# Patient Record
Sex: FEMALE | Race: BLACK OR AFRICAN AMERICAN | Employment: FULL TIME | URBAN - METROPOLITAN AREA
[De-identification: names, ages, dates, MRNs, and addresses within clinical notes are randomized per-mention and may not be internally consistent; named-entity substitution may affect disease eponyms.]

---

## 2019-01-25 ENCOUNTER — HOSPITAL ENCOUNTER (EMERGENCY)
Age: 29
Discharge: HOME | End: 2019-01-25
Attending: EMERGENCY MEDICINE
Payer: MEDICAID

## 2019-01-25 ENCOUNTER — APPOINTMENT (OUTPATIENT)
Dept: ULTRASOUND IMAGING | Age: 29
End: 2019-01-25
Payer: MEDICAID

## 2019-01-25 VITALS
BODY MASS INDEX: 28.7 KG/M2 | OXYGEN SATURATION: 100 % | SYSTOLIC BLOOD PRESSURE: 168 MMHG | HEART RATE: 79 BPM | HEIGHT: 63 IN | DIASTOLIC BLOOD PRESSURE: 117 MMHG | WEIGHT: 162 LBS | RESPIRATION RATE: 14 BRPM

## 2019-01-25 DIAGNOSIS — Z76.0 MEDICATION REFILL: ICD-10-CM

## 2019-01-25 DIAGNOSIS — R10.2 PELVIC PAIN: Primary | ICD-10-CM

## 2019-01-25 DIAGNOSIS — R03.0 ELEVATED BLOOD PRESSURE READING: ICD-10-CM

## 2019-01-25 LAB
AMORPH CRY URNS QL MICRO: ABNORMAL
APPEARANCE UR: ABNORMAL
BACTERIA URNS QL MICRO: NEGATIVE /HPF
BILIRUB UR QL: NEGATIVE
COLOR UR: ABNORMAL
EPITH CASTS URNS QL MICRO: ABNORMAL /LPF
GLUCOSE UR STRIP.AUTO-MCNC: NEGATIVE MG/DL
HCG UR QL: NEGATIVE
HGB UR QL STRIP: NEGATIVE
KETONES UR QL STRIP.AUTO: NEGATIVE MG/DL
LEUKOCYTE ESTERASE UR QL STRIP.AUTO: ABNORMAL
NITRITE UR QL STRIP.AUTO: NEGATIVE
PH UR STRIP: 7.5 [PH] (ref 5–8)
PROT UR STRIP-MCNC: NEGATIVE MG/DL
RBC #/AREA URNS HPF: ABNORMAL /HPF (ref 0–5)
SP GR UR REFRACTOMETRY: 1.02 (ref 1–1.03)
UA: UC IF INDICATED,UAUC: ABNORMAL
UROBILINOGEN UR QL STRIP.AUTO: 1 EU/DL (ref 0.2–1)
WBC URNS QL MICRO: ABNORMAL /HPF (ref 0–4)

## 2019-01-25 PROCEDURE — 76830 TRANSVAGINAL US NON-OB: CPT

## 2019-01-25 PROCEDURE — 74011250637 HC RX REV CODE- 250/637: Performed by: PHYSICIAN ASSISTANT

## 2019-01-25 PROCEDURE — 99282 EMERGENCY DEPT VISIT SF MDM: CPT

## 2019-01-25 PROCEDURE — 81025 URINE PREGNANCY TEST: CPT

## 2019-01-25 PROCEDURE — 81001 URINALYSIS AUTO W/SCOPE: CPT

## 2019-01-25 RX ORDER — LISINOPRIL AND HYDROCHLOROTHIAZIDE 20; 25 MG/1; MG/1
1 TABLET ORAL DAILY
Qty: 30 TAB | Refills: 0 | Status: SHIPPED | OUTPATIENT
Start: 2019-01-25 | End: 2019-02-24

## 2019-01-25 RX ORDER — HYDROCHLOROTHIAZIDE 25 MG/1
25 TABLET ORAL
Status: COMPLETED | OUTPATIENT
Start: 2019-01-25 | End: 2019-01-25

## 2019-01-25 RX ORDER — LIDOCAINE 50 MG/G
PATCH TOPICAL
Qty: 5 EACH | Refills: 0 | Status: SHIPPED | OUTPATIENT
Start: 2019-01-25 | End: 2020-11-30

## 2019-01-25 RX ORDER — NAPROXEN 500 MG/1
500 TABLET ORAL
Qty: 20 TAB | Refills: 0 | Status: SHIPPED | OUTPATIENT
Start: 2019-01-25 | End: 2019-04-15

## 2019-01-25 RX ORDER — LISINOPRIL 20 MG/1
20 TABLET ORAL
Status: COMPLETED | OUTPATIENT
Start: 2019-01-25 | End: 2019-01-25

## 2019-01-25 RX ADMIN — LISINOPRIL 20 MG: 20 TABLET ORAL at 12:29

## 2019-01-25 RX ADMIN — HYDROCHLOROTHIAZIDE 25 MG: 25 TABLET ORAL at 12:29

## 2019-01-25 NOTE — DISCHARGE INSTRUCTIONS
Patient Education        Follow up with Ob/Gyn for recheck. Follow up with primary care for recheck of blood pressure. Return to the Emergency Dept for any worsening pain, nausea/vomiting, fever, decreased oral intake/urine output. Pelvic Pain: Care Instructions  Your Care Instructions    Pelvic pain, or pain in the lower belly, can have many causes. Often pelvic pain is not serious and gets better in a few days. If your pain continues or gets worse, you may need tests and treatment. Tell your doctor about any new symptoms. These may be signs of a serious problem. Follow-up care is a key part of your treatment and safety. Be sure to make and go to all appointments, and call your doctor if you are having problems. It's also a good idea to know your test results and keep a list of the medicines you take. How can you care for yourself at home? · Rest until you feel better. Lie down, and raise your legs by placing a pillow under your knees. · Drink plenty of fluids. You may find that small, frequent sips are easier on your stomach than if you drink a lot at once. Avoid drinks with carbonation or caffeine, such as soda pop, tea, or coffee. · Try eating several small meals instead of 2 or 3 large ones. Eat mild foods, such as rice, dry toast or crackers, bananas, and applesauce. Avoid fatty and spicy foods, other fruits, and alcohol until 48 hours after your symptoms have gone away. · Take an over-the-counter pain medicine, such as acetaminophen (Tylenol), ibuprofen (Advil, Motrin), or naproxen (Aleve). Read and follow all instructions on the label. · Do not take two or more pain medicines at the same time unless the doctor told you to. Many pain medicines have acetaminophen, which is Tylenol. Too much acetaminophen (Tylenol) can be harmful. · You can put a heating pad, a warm cloth, or moist heat on your belly to relieve pain. When should you call for help?   Call your doctor now or seek immediate medical care if:    · You have a new or higher fever.     · You have unusual vaginal bleeding.     · You have new or worse belly or pelvic pain.     · You have vaginal discharge that has increased in amount or smells bad.    Watch closely for changes in your health, and be sure to contact your doctor if:    · You do not get better as expected. Where can you learn more? Go to http://parish-wilman.info/. Enter [phone] in the search box to learn more about \"Pelvic Pain: Care Instructions. \"  Current as of: May 14, 2018  Content Version: 11.9  © 4513-3684 [employer]. Care instructions adapted under license by [employer] (which disclaims liability or warranty for this information). If you have questions about a medical condition or this instruction, always ask your healthcare professional. [address] any warranty or liability for your use of this information. Patient Education        Elevated Blood Pressure: Care Instructions  Your Care Instructions    Blood pressure is a measure of how hard the blood pushes against the walls of your arteries. It's normal for blood pressure to go up and down throughout the day. But if it stays up over time, you have high blood pressure. Two numbers tell you your blood pressure. The first number is the systolic pressure. It shows how hard the blood pushes when your heart is pumping. The second number is the diastolic pressure. It shows how hard the blood pushes between heartbeats, when your heart is relaxed and filling with blood. An ideal blood pressure in adults is less than 120/80 (say \"120 over 80\"). High blood pressure is 140/90 or higher. You have high blood pressure if your top number is 140 or higher or your bottom number is 90 or higher, or both. The main test for high blood pressure is simple, fast, and painless.  To diagnose high blood pressure, your doctor will test your blood pressure at different times. After testing your blood pressure, your doctor may ask you to test it again when you are home. If you are diagnosed with high blood pressure, you can work with your doctor to make a long-term plan to manage it. Follow-up care is a key part of your treatment and safety. Be sure to make and go to all appointments, and call your doctor if you are having problems. It's also a good idea to know your test results and keep a list of the medicines you take. How can you care for yourself at home? · Do not smoke. Smoking increases your risk for heart attack and stroke. If you need help quitting, talk to your doctor about stop-smoking programs and medicines. These can increase your chances of quitting for good. · Stay at a healthy weight. · Try to limit how much sodium you eat to less than 2,300 milligrams (mg) a day. Your doctor may ask you to try to eat less than 1,500 mg a day. · Be physically active. Get at least 30 minutes of exercise on most days of the week. Walking is a good choice. You also may want to do other activities, such as running, swimming, cycling, or playing tennis or team sports. · Avoid or limit alcohol. Talk to your doctor about whether you can drink any alcohol. · Eat plenty of fruits, vegetables, and low-fat dairy products. Eat less saturated and total fats. · Learn how to check your blood pressure at home. When should you call for help? Call your doctor now or seek immediate medical care if:  ? · Your blood pressure is much higher than normal (such as 180/110 or higher). ? · You think high blood pressure is causing symptoms such as:  ¨ Severe headache. ¨ Blurry vision. ? Watch closely for changes in your health, and be sure to contact your doctor if:  ? · You do not get better as expected. Where can you learn more? Go to http://parish-wilman.info/. Enter C698 in the search box to learn more about \"Elevated Blood Pressure: Care Instructions. \"  Current as of: September 21, 2016  Content Version: 11.4  © 9185-8599 [employer]. Care instructions adapted under license by [employer] (which disclaims liability or warranty for this information). If you have questions about a medical condition or this instruction, always ask your healthcare professional. [address] any warranty or liability for your use of this information.

## 2019-01-25 NOTE — ED NOTES
Medicated pt per orders. Pt discharged by PA. Pt provided with discharge instructions Rx and instructions on follow up care. Pt out of ED ambulatory without difficulty accompanied by family.

## 2019-01-25 NOTE — ED PROVIDER NOTES
EMERGENCY DEPARTMENT HISTORY AND PHYSICAL EXAM 
 
 
Date: 1/25/2019 Patient Name: Kun Ruth History of Presenting Illness Chief Complaint Patient presents with  Pelvic Pain For about 2 weeks. Went to her OBGYN today who did a pelvic and saw no abnormalities. Patient reports vaginal spotting. Labs were sent off, sent to ED for ultrasound. History Provided By: Patient HPI: Kun Ruth, 29 y.o. female presents ambulatory to the ED with c/o 2 week h/o R sided pelvic pain. Pt c/o mild vaginal spotting but no discharge. She was evaluated at [facility] with pelvic exam and referred to the ED for ultrasound as they could not fit her in to their schedule. She denied abdominal pain but stated \"but I guess it could be because I am always nauseated\". Pt denied vomiting or diarrhea. No fever/chills. Pt without dysuria/hematuria. Pt is o/w healthy without cough, congestion, ST, shortness of breath, chest pain. Chief Complaint: R sided pelvic pain Duration: 2 Weeks Timing:  Acute Location: R pelvis Quality: Aching Severity: Moderate Modifying Factors: no exacerbating/alleviating features Associated Symptoms: occasional vaginal spotting There are no other complaints, changes, or physical findings at this time. PCP: None Current Outpatient Medications Medication Sig Dispense Refill  ibuprofen (MOTRIN) 600 mg tablet Take 1 Tab by mouth every six (6) hours as needed for Pain. 20 Tab 0  cyclobenzaprine (FLEXERIL) 10 mg tablet Take 1 Tab by mouth three (3) times daily as needed for Muscle Spasm(s). 15 Tab 0  
 lisinopril-hydrochlorothiazide (PRINZIDE, ZESTORETIC) 10-12.5 mg per tablet Take 1 Tab by mouth daily. 20 Tab 0  
 HYDROcodone-acetaminophen (NORCO) 7.5-325 mg per tablet Take 1 Tab by mouth every six (6) hours as needed for Pain. Max Daily Amount: 4 Tabs.  20 Tab 0  
 butalbital-acetaminophen-caffeine (FIORICET) -40 mg per tablet Take 1 Tab by mouth every four (4) hours as needed for Headache. Max Daily Amount: 6 Tabs. Maximum dose 6 capsules daily 15 Tab 0 Past History Past Medical History: 
Past Medical History:  
Diagnosis Date  Abnormal Pap smear HPV  Anemia 9/30/2010  
 no problems in this pregnancy  Anemia 9/30/2010  Delivery normal   
 Elevated BP 9/30/2010  Essential hypertension   
 chronic  Gestational hypertension 9/16/2010  Hypertension Past Surgical History: 
History reviewed. No pertinent surgical history. Family History: 
History reviewed. No pertinent family history. Social History: 
Social History Tobacco Use  Smoking status: Never Smoker Substance Use Topics  Alcohol use: Yes Comment: occasionaly  Drug use: No  
 
 
Allergies: 
No Known Allergies Review of Systems Review of Systems Constitutional: Negative for chills and fever. HENT: Negative for congestion, rhinorrhea and sore throat. Respiratory: Negative for cough and shortness of breath. Cardiovascular: Negative for chest pain and palpitations. Gastrointestinal: Positive for diarrhea. Negative for abdominal pain, nausea and vomiting. Endocrine: Negative for polydipsia, polyphagia and polyuria. Genitourinary: Positive for pelvic pain and vaginal bleeding. Negative for decreased urine volume, dysuria, hematuria, urgency, vaginal discharge and vaginal pain. Musculoskeletal: Negative for back pain, neck pain and neck stiffness. Skin: Negative for rash and wound. Allergic/Immunologic: Negative for food allergies and immunocompromised state. Neurological: Negative for dizziness, weakness and headaches. Psychiatric/Behavioral: Negative for agitation and confusion. Physical Exam  
Physical Exam  
Constitutional: She is oriented to person, place, and time. She appears well-developed and well-nourished. No distress. WDWN AA female, alert, in NAD HENT:  
 Head: Normocephalic and atraumatic. Nose: Nose normal.  
Mouth/Throat: No oropharyngeal exudate. Eyes: Conjunctivae and EOM are normal. Right eye exhibits no discharge. Left eye exhibits no discharge. No scleral icterus. Neck: Normal range of motion. Neck supple. Cardiovascular: Normal rate, regular rhythm, normal heart sounds and intact distal pulses. Pulmonary/Chest: Effort normal and breath sounds normal. No respiratory distress. She has no wheezes. Abdominal: Soft. She exhibits no distension. There is tenderness. There is no rebound and no guarding.  
+ R suprapubic tenderness, no CVAT Musculoskeletal: Normal range of motion. She exhibits no edema or tenderness. Neurological: She is alert and oriented to person, place, and time. She exhibits normal muscle tone. Coordination normal.  
Skin: Skin is warm and dry. She is not diaphoretic. No pallor. Psychiatric: She has a normal mood and affect. Her behavior is normal.  
Nursing note and vitals reviewed. Diagnostic Study Results Labs - No results found for this or any previous visit (from the past 12 hour(s)). Radiologic Studies -  
US TRANSVAGINAL    (Results Pending) US TRANSVAGINAL (Final result) Result time 01/25/19 11:53:34 Final result by Owen Roca MD (01/25/19 11:53:34) Impression:  
 IMPRESSION: No acute process. Ovaries are within normal limits. 
 
   
  
  
  
Narrative: EXAM:  ULTRASOUND FEMALE PELVIS TRANSABDOMINAL AND TRANSVAGINAL 
 
TRANSABDOMINAL  
 
 
 
TRANSVAGINAL 
 
INDICATION: Pelvic pain. COMPARISON: None. TECHNIQUE: Transvaginal sonography was performed with multiple static images of 
the uterus and ovariesobtained. FINDINGS:  
 
UTERUS: 
The uterus is normal. Uterus measures 5.3 x 2.9 x 5 cm. ENDOMETRIUM: 
The endometrial stripe measures 3mm. There is a trace amount of fluid in the 
endometrium RIGHT OVARY: 
 The right ovary is normal. The right ovary measures 3 x 1.5 x 2.7 cm.  There is 
normal color flow to the right ovary. LEFT OVARY: 
The left ovary is normal. The left ovary measures 3.2 x 1.9 x 1.9 cm. There is 
normal color flow to the left ovary. CUL-DE-SAC: 
There is no fluid or mass or other abnormality in the pelvic cul-de-sac.  
  
  
  
   
 
 
 
 
Medical Decision Making I am the first provider for this patient. I reviewed the vital signs, available nursing notes, past medical history, past surgical history, family history and social history. Vital Signs-Reviewed the patient's vital signs. Patient Vitals for the past 12 hrs: 
 Pulse Resp BP SpO2  
01/25/19 1012 79 14 (!) 185/124 100 % Records Reviewed: Nursing Notes, Old Medical Records, Previous Radiology Studies and Previous Laboratory Studies Provider Notes (Medical Decision Making): Ovarian cyst, mass, UTI, pregnancy ED Course:  
Initial assessment performed. The patients presenting problems have been discussed, and they are in agreement with the care plan formulated and outlined with them. I have encouraged them to ask questions as they arise throughout their visit. HTN COUNSELING Reviewed the risks of uncontrolled HTN to include stroke, heart attack, renal failure, aneurysm and death. They will take their medications daily and follow up with their PCP for recheck. DISCHARGE NOTE: 
The care plan has been outline with the patient and/or family, who verbally conveyed understanding and agreement. Available results have been reviewed. Patient and/or family understand the follow up plan as outlined and discharge instructions. Should their condition deterioration at any time after discharge the patient agrees to return, follow up sooner than outlined or seek medical assistance at the closest Emergency Room as soon as possible. Questions have been answered.  Discharge instructions and educational information regarding the patient's diagnosis as well a list of reasons why the patient would want to seek immediate medical attention, should their condition change, were reviewed directly with the patient/family PLAN: 
1. Discharge Medication List as of 1/25/2019 12:43 PM  
  
START taking these medications Details  
naproxen (NAPROSYN) 500 mg tablet Take 1 Tab by mouth every twelve (12) hours as needed for Pain for up to 20 doses. , Print, Disp-20 Tab, R-0  
  
lidocaine (LIDODERM) 5 % Apply patch to the affected area for 12 hours a day and remove for 12 hours a day., Print, Disp-5 Each, R-0  
  
lisinopril-hydroCHLOROthiazide (PRINZIDE, ZESTORETIC) 20-25 mg per tablet Take 1 Tab by mouth daily for 30 days. , Print, Disp-30 Tab, R-0  
  
  
CONTINUE these medications which have NOT CHANGED Details  
lisinopril-hydrochlorothiazide (PRINZIDE, ZESTORETIC) 10-12.5 mg per tablet Take 1 Tab by mouth daily. , Print, Disp-20 Tab, R-0  
  
  
 
2. Follow-up Information Follow up With Specialties Details Why Contact Info [address]    [address]   [phone] [facility] EMERGENCY DEPT Emergency Medicine  If symptoms worsen [address] [facility] 
[phone] Return to ED if worse Diagnosis Clinical Impression: 1. Pelvic pain 2. Elevated blood pressure reading 3. Medication refill

## 2019-01-25 NOTE — LETTER
[address] 
\[facility]\"" EMERGENCY DEPT 
[address] [place] [address] [address] [id number] [phone] Work/School Note Date: 1/25/2019 To Whom It May concern: 
 
Phillip Guadalupe was seen and treated today in the emergency room. She may return to work in 1 to 2 days, as symptoms improve. Sincerely, Sue De Guzman

## 2019-04-11 ENCOUNTER — HOSPITAL ENCOUNTER (EMERGENCY)
Age: 29
Discharge: HOME | End: 2019-04-11
Attending: EMERGENCY MEDICINE
Payer: MEDICAID

## 2019-04-11 VITALS
BODY MASS INDEX: 27.82 KG/M2 | TEMPERATURE: 98.6 F | SYSTOLIC BLOOD PRESSURE: 186 MMHG | WEIGHT: 157 LBS | HEIGHT: 63 IN | RESPIRATION RATE: 16 BRPM | HEART RATE: 67 BPM | OXYGEN SATURATION: 100 % | DIASTOLIC BLOOD PRESSURE: 123 MMHG

## 2019-04-11 DIAGNOSIS — M62.838 MUSCLE SPASMS OF NECK: Primary | ICD-10-CM

## 2019-04-11 DIAGNOSIS — I10 HYPERTENSION, UNSPECIFIED TYPE: ICD-10-CM

## 2019-04-11 PROCEDURE — 74011250636 HC RX REV CODE- 250/636: Performed by: EMERGENCY MEDICINE

## 2019-04-11 PROCEDURE — 96372 THER/PROPH/DIAG INJ SC/IM: CPT

## 2019-04-11 PROCEDURE — 99282 EMERGENCY DEPT VISIT SF MDM: CPT

## 2019-04-11 RX ORDER — KETOROLAC TROMETHAMINE 10 MG/1
10 TABLET, FILM COATED ORAL
Qty: 20 TAB | Refills: 0 | Status: SHIPPED | OUTPATIENT
Start: 2019-04-11 | End: 2019-04-15

## 2019-04-11 RX ORDER — CYCLOBENZAPRINE HCL 10 MG
10 TABLET ORAL
Qty: 12 TAB | Refills: 0 | Status: SHIPPED | OUTPATIENT
Start: 2019-04-11 | End: 2019-04-15

## 2019-04-11 RX ORDER — MEDROXYPROGESTERONE ACETATE 150 MG/ML
150 INJECTION, SUSPENSION INTRAMUSCULAR
COMMUNITY

## 2019-04-11 RX ORDER — OXYCODONE AND ACETAMINOPHEN 5; 325 MG/1; MG/1
1 TABLET ORAL
Qty: 10 TAB | Refills: 0 | Status: SHIPPED | OUTPATIENT
Start: 2019-04-11 | End: 2019-04-14

## 2019-04-11 RX ORDER — KETOROLAC TROMETHAMINE 30 MG/ML
30 INJECTION, SOLUTION INTRAMUSCULAR; INTRAVENOUS
Status: COMPLETED | OUTPATIENT
Start: 2019-04-11 | End: 2019-04-11

## 2019-04-11 RX ADMIN — KETOROLAC TROMETHAMINE 30 MG: 30 INJECTION, SOLUTION INTRAMUSCULAR; INTRAVENOUS at 21:19

## 2019-04-12 NOTE — ED TRIAGE NOTES
Pt comes in with pain at the base of the skull and neck x 1.5 weeks. Pt denies injury. Pt's BP elevated at 204/140 and 189/135 on recheck in triage. Pt reports taking BP medication daily.

## 2019-04-12 NOTE — ED PROVIDER NOTES
31yo female with neck spasm for 1 1/2 weeks with associated headache, who hasn't taken her hctz/lisinopril today. (she does have it at home). She states a week and half ago she awoke with a \"crick\" in her neck, on the right side. Since then she has had pain in her right posterior neck rating up to the right side of her head. She is also had stiffness/decreased movement of her right neck. She rates her pain as 9 out of 10. Neck Pain Associated symptoms include headaches. Pertinent negatives include no chest pain. Hypertension Associated symptoms include headaches and neck pain. Pertinent negatives include no chest pain, no confusion, no dizziness, no shortness of breath and no nausea. Past Medical History:  
Diagnosis Date  Abnormal Pap smear HPV  Anemia 9/30/2010  
 no problems in this pregnancy  Anemia 9/30/2010  Delivery normal   
 Elevated BP 9/30/2010  Essential hypertension   
 chronic  Gestational hypertension 9/16/2010  Hypertension History reviewed. No pertinent surgical history. History reviewed. No pertinent family history. Social History Socioeconomic History  Marital status: SINGLE Spouse name: Not on file  Number of children: Not on file  Years of education: Not on file  Highest education level: Not on file Occupational History  Not on file Social Needs  Financial resource strain: Not on file  Food insecurity:  
  Worry: Not on file Inability: Not on file  Transportation needs:  
  Medical: Not on file Non-medical: Not on file Tobacco Use  Smoking status: Never Smoker  Smokeless tobacco: Never Used Substance and Sexual Activity  Alcohol use: Yes Comment: occasionaly  Drug use: No  
 Sexual activity: Yes  
  Partners: Male Birth control/protection: Implant Lifestyle  Physical activity:  
  Days per week: Not on file Minutes per session: Not on file  Stress: Not on file Relationships  Social connections:  
  Talks on phone: Not on file Gets together: Not on file Attends Syn[employer] service: Not on file Active member of club or organization: Not on file Attends meetings of clubs or organizations: Not on file Relationship status: Not on file  Intimate partner violence:  
  Fear of current or ex partner: Not on file Emotionally abused: Not on file Physically abused: Not on file Forced sexual activity: Not on file Other Topics Concern  Not on file Social History Narrative ** Merged History Encounter ** ALLERGIES: Patient has no known allergies. Review of Systems Constitutional: Negative. Negative for chills, fever and unexpected weight change. HENT: Negative. Negative for congestion and trouble swallowing. Eyes: Negative for discharge. Respiratory: Negative. Negative for cough, chest tightness and shortness of breath. Cardiovascular: Negative. Negative for chest pain. Gastrointestinal: Negative. Negative for abdominal distention, abdominal pain, constipation, diarrhea and nausea. Endocrine: Negative. Genitourinary: Negative. Negative for difficulty urinating, dysuria, frequency and urgency. Musculoskeletal: Positive for neck pain. Negative for arthralgias and myalgias. Skin: Negative. Negative for color change. Allergic/Immunologic: Negative. Neurological: Positive for headaches. Negative for dizziness and speech difficulty. Hematological: Negative. Psychiatric/Behavioral: Negative. Negative for agitation and confusion. All other systems reviewed and are negative. Vitals:  
 04/11/19 2026 04/11/19 2030 BP: (!) 204/140 (!) 189/135 Pulse: 74 Resp: 18 Temp: 98.6 °F (37 °C) SpO2: 100% Weight: 71.2 kg (157 lb) Height: 5' 3\" (1.6 m) Physical Exam  
Constitutional: She is oriented to person, place, and time.  She appears well-developed and well-nourished. HENT:  
Head: Normocephalic and atraumatic. Eyes: Conjunctivae and EOM are normal.  
Neck: Neck supple. Palpable spasm and tenderness Right paraspinal cervical motions. Decreased rightward flexion and rotation of head. Cardiovascular: Normal rate, regular rhythm and intact distal pulses. Pulmonary/Chest: Effort normal. No respiratory distress. Abdominal: Soft. There is no tenderness. Musculoskeletal: Normal range of motion. She exhibits no deformity. Neurological: She is alert and oriented to person, place, and time. Skin: Skin is warm and dry. Psychiatric: She has a normal mood and affect. Her behavior is normal. Thought content normal.  
Vitals reviewed. MDM Procedures LABORATORY TESTS: 
No results found for this or any previous visit (from the past 12 hour(s)). IMAGING RESULTS: 
No orders to display MEDICATIONS GIVEN: 
Medications  
ketorolac (TORADOL) injection 30 mg (30 mg IntraMUSCular Given 4/11/19 2119) IMPRESSION: 
1. Muscle spasms of neck 2. Hypertension, unspecified type PLAN: 
1. Discharge Medication List as of 4/11/2019  9:48 PM  
  
START taking these medications Details  
cyclobenzaprine (FLEXERIL) 10 mg tablet Take 1 Tab by mouth three (3) times daily as needed for Muscle Spasm(s). , Print, Disp-12 Tab, R-0  
  
oxyCODONE-acetaminophen (PERCOCET) 5-325 mg per tablet Take 1 Tab by mouth every eight (8) hours as needed for Pain for up to 3 days. Max Daily Amount: 3 Tabs. Indications: Pain, Print, Disp-10 Tab, R-0  
  
ketorolac (TORADOL) 10 mg tablet Take 1 Tab by mouth every six (6) hours as needed for Pain., Print, Disp-20 Tab, R-0  
  
  
CONTINUE these medications which have NOT CHANGED  Details  
medroxyPROGESTERone (DEPO-PROVERA) 150 mg/mL injection 150 mg by IntraMUSCular route once., Historical Med  
  
naproxen (NAPROSYN) 500 mg tablet Take 1 Tab by mouth every twelve (12) hours as needed for Pain for up to 20 doses. , Print, Disp-20 Tab, R-0  
  
lidocaine (LIDODERM) 5 % Apply patch to the affected area for 12 hours a day and remove for 12 hours a day., Print, Disp-5 Each, R-0  
  
lisinopril-hydrochlorothiazide (PRINZIDE, ZESTORETIC) 10-12.5 mg per tablet Take 1 Tab by mouth daily. , Print, Disp-20 Tab, R-0  
  
  
 
2. Follow-up Information None Return to ED if worse

## 2019-04-12 NOTE — DISCHARGE INSTRUCTIONS
Patient Education        Neck Spasm: Care Instructions  Your Care Instructions  A neck spasm is sudden tightness and pain in your neck muscles. A spasm may be caused by some activities or repeated movements. For example, you may be more likely to have a neck spasm if you slouch, paint a ceiling, work at a computer, or sleep with your neck twisted. But the cause isn't always clear. Home treatment includes using heat or ice, taking over-the-counter (OTC) pain medicines, and avoiding activities that may lead to neck pain. Gentle stretching, or treatments such as massage or manipulation, may also help ease a neck spasm. For a neck spasm that doesn't get better with home care, your doctor may prescribe medicine. He or she may also suggest exercise or physical therapy to help strengthen or relax your neck muscles. Follow-up care is a key part of your treatment and safety. Be sure to make and go to all appointments, and call your doctor if you are having problems. It's also a good idea to know your test results and keep a list of the medicines you take. How can you care for yourself at home? · To relieve pain, use heat or ice (whichever feels better) on the affected area. ? Put a warm water bottle, a heating pad set on low, or a warm cloth on your neck. Put a thin cloth between the heating pad and your skin. Do not go to sleep with a heating pad on your skin. ? Try ice or a cold pack on the area for 10 to 20 minutes at a time. Put a thin cloth between the ice and your skin. · Ask your doctor if you can take acetaminophen (such as Tylenol) or nonsteroidal anti-inflammatory drugs, such as ibuprofen or naproxen. Your doctor can prescribe stronger medicines if needed. Be safe with medicines. Read and follow all instructions on the label. · Stretch your muscles every day, especially before and after exercise and at bedtime. Regular stretching can help relax your muscles.   · Try to find a pillow and a position in bed that help improve your night's rest.  · Try to stay active. It's best to start activity slowly. If an exercise makes your pain worse, stop doing it. When should you call for help? Call 911 anytime you think you may need emergency care. For example, call if:    · You are unable to move an arm or a leg at all.   [facility] your doctor now or seek immediate medical care if:    · You have new or worse symptoms in your arms, legs, belly, or buttocks. Symptoms may include:  ? Numbness or tingling. ? Weakness. ? Pain.     · You lose bladder or bowel control.    Watch closely for changes in your health, and be sure to contact your doctor if:    · You do not get better as expected. Where can you learn more? Go to http://parishSeahorsewilman.info/. Enter E709 in the search box to learn more about \"Neck Spasm: Care Instructions. \"  Current as of: September 20, 2018  Content Version: 11.9  © 6902-5675 [employer]. Care instructions adapted under license by [employer] (which disclaims liability or warranty for this information). If you have questions about a medical condition or this instruction, always ask your healthcare professional. [place] [address] any warranty or liability for your use of this information. Patient Education     Patient Education        Learning About High Blood Pressure  What is high blood pressure? Blood pressure is a measure of how hard the blood pushes against the walls of your arteries. It's normal for blood pressure to go up and down throughout the day, but if it stays up, you have high blood pressure. Another name for high blood pressure is hypertension. Two numbers tell you your blood pressure. The first number is the systolic pressure. It shows how hard the blood pushes when your heart is pumping. The second number is the diastolic pressure.  It shows how hard the blood pushes between heartbeats, when your heart is relaxed and filling with blood. Your doctor will give you a goal for your blood pressure. Your goal will be based on your health and your age. High blood pressure (hypertension) means that the top number stays high, or the bottom number stays high, or both. High blood pressure increases the risk of stroke, heart attack, and other problems. You and your doctor will talk about your risks of these problems based on your blood pressure. What happens when you have high blood pressure? · Blood flows through your arteries with too much force. Over time, this damages the walls of your arteries. But you can't feel it. High blood pressure usually doesn't cause symptoms. · Fat and calcium start to build up in your arteries. This buildup is called plaque. Plaque makes your arteries narrower and stiffer. Blood can't flow through them as easily. · This lack of good blood flow starts to damage some of the organs in your body. This can lead to problems such as coronary artery disease and heart attack, heart failure, stroke, kidney failure, and eye damage. How can you prevent high blood pressure? · Stay at a healthy weight. · Try to limit how much sodium you eat to less than 2,300 milligrams (mg) a day. If you limit your sodium to 1,500 mg a day, you can lower your blood pressure even more. ? Buy foods that are labeled \"unsalted,\" \"sodium-free,\" or \"low-sodium. \" Foods labeled \"reduced-sodium\" and \"light sodium\" may still have too much sodium. ? Flavor your food with garlic, lemon juice, onion, vinegar, herbs, and spices instead of salt. Do not use soy sauce, steak sauce, onion salt, garlic salt, mustard, or ketchup on your food. ? Use less salt (or none) when recipes call for it. You can often use half the salt a recipe calls for without losing flavor. · Be physically active. Get at least 30 minutes of exercise on most days of the week. Walking is a good choice.  You also may want to do other activities, such as running, swimming, cycling, or playing tennis or team sports. · Limit alcohol to 2 drinks a day for men and 1 drink a day for women. · Eat plenty of fruits, vegetables, and low-fat dairy products. Eat less saturated and total fats. How is high blood pressure treated? · Your doctor will suggest making lifestyle changes. For example, your doctor may ask you to eat healthy foods, quit smoking, lose extra weight, and be more active. · If lifestyle changes don't help enough, your doctor may recommend that you take medicine. · When blood pressure is very high, medicines are needed to lower it. Follow-up care is a key part of your treatment and safety. Be sure to make and go to all appointments, and call your doctor if you are having problems. It's also a good idea to know your test results and keep a list of the medicines you take. Where can you learn more? Go to http://parish-wilman.info/. Enter P501 in the search box to learn more about \"Learning About High Blood Pressure. \"  Current as of: July 22, 2018  Content Version: 11.9  © 6661-4754 [employer]. Care instructions adapted under license by [employer] (which disclaims liability or warranty for this information). If you have questions about a medical condition or this instruction, always ask your healthcare professional. [address] any warranty or liability for your use of this information. Torticollis: Care Instructions  Your Care Instructions  Torticollis is a severe tightness of the muscles on one side of the neck. The tight muscles can make the head turn to one side, lean to one side, or be pulled forward or backward. It is also called wryneck. Your doctor asked questions about your health and examined you. You may also have had X-rays or other tests. If your doctor thinks another medical problem is causing your tight neck muscles, you may need more tests. Torticollis usually gets better with home care. Your doctor may have you take medicine to relieve pain or relax your muscles. He or she may suggest exercise and physical therapy to help increase flexibility and relieve stress. Your doctor may also have you wear a special collar, called a cervical collar, for a day or two. The collar may help make your neck more comfortable. Follow-up care is a key part of your treatment and safety. Be sure to make and go to all appointments, and call your doctor if you are having problems. It's also a good idea to know your test results and keep a list of the medicines you take. How can you care for yourself at home? · Be safe with medicines. Read and follow all instructions on the label. ? If the doctor gave you a prescription medicine for pain, take it as prescribed. ? If you are not taking a prescription pain medicine, ask your doctor if you can take an over-the-counter medicine. · Try using a heating pad on a low or medium setting for 15 to 20 minutes every 2 or 3 hours. Try a warm shower in place of one session with the heating pad. · Try using an ice pack for 10 to 15 minutes every 2 to 3 hours. Put a thin cloth between the ice and your skin. · If your doctor recommends a cervical collar, wear it exactly as directed. When should you call for help? Call your doctor now or seek immediate medical care if:    · You have new or worse numbness in your arms, buttocks, or legs.     · You have new or worse weakness in your arms or legs.     · Your neck pain gets worse.     · You lose bladder or bowel control.    Watch closely for changes in your health, and be sure to contact your doctor if:    · You do not get better as expected. Where can you learn more? Go to http://parish-wilman.info/. Enter I706 in the search box to learn more about \"Torticollis: Care Instructions. \"  Current as of: September 20, 2018  Content Version: 11.9  © 8206-7801 [employer].  Care instructions adapted under license by [address] (which disclaims liability or warranty for this information). If you have questions about a medical condition or this instruction, always ask your healthcare professional. [address] any warranty or liability for your use of this information.

## 2019-04-12 NOTE — ED NOTES
Emergency Department Nursing Plan of Care The Nursing Plan of Care is developed from the Nursing assessment and Emergency Department Attending provider initial evaluation. The plan of care may be reviewed in the ED Provider note. The Plan of Care was developed with the following considerations:  
Patient / Family readiness to learn indicated by:verbalized understanding Persons(s) to be included in education: patient Barriers to Learning/Limitations:No 
 
Signed Jamarcus Venegas RN   
4/11/2019   9:35 PM

## 2019-04-12 NOTE — ED NOTES
Patient (s) was given copy of dc instructions and three paper script(s) and no electronic scripts. Patient (s) has verbalized understanding of instructions and script (s). Patient was given a current medication reconciliation form and verbalized understanding of their medications. Patient (s) has verbalized understanding of the importance of discussing medications with  his or her physician or clinic they will be following up with. Patient alert and oriented and in no acute distress. Patient offered wheelchair from treatment area to hospital entrance, patient declined wheelchair. Patient left ED with self.

## 2019-04-15 ENCOUNTER — HOSPITAL ENCOUNTER (EMERGENCY)
Age: 29
Discharge: HOME | End: 2019-04-15
Attending: EMERGENCY MEDICINE
Payer: MEDICAID

## 2019-04-15 ENCOUNTER — APPOINTMENT (OUTPATIENT)
Dept: CT IMAGING | Age: 29
End: 2019-04-15
Attending: PHYSICIAN ASSISTANT
Payer: MEDICAID

## 2019-04-15 ENCOUNTER — APPOINTMENT (OUTPATIENT)
Dept: ULTRASOUND IMAGING | Age: 29
End: 2019-04-15
Attending: PHYSICIAN ASSISTANT
Payer: MEDICAID

## 2019-04-15 VITALS
WEIGHT: 150 LBS | BODY MASS INDEX: 26.58 KG/M2 | TEMPERATURE: 98.9 F | RESPIRATION RATE: 16 BRPM | HEART RATE: 70 BPM | DIASTOLIC BLOOD PRESSURE: 126 MMHG | OXYGEN SATURATION: 97 % | HEIGHT: 63 IN | SYSTOLIC BLOOD PRESSURE: 176 MMHG

## 2019-04-15 DIAGNOSIS — N20.1 RIGHT URETERAL STONE: Primary | ICD-10-CM

## 2019-04-15 LAB
ALBUMIN SERPL-MCNC: 3.7 G/DL (ref 3.5–5)
ALBUMIN/GLOB SERPL: 0.9 {RATIO} (ref 1.1–2.2)
ALP SERPL-CCNC: 167 U/L (ref 45–117)
ALT SERPL-CCNC: 18 U/L (ref 12–78)
ANION GAP SERPL CALC-SCNC: 3 MMOL/L (ref 5–15)
APPEARANCE UR: ABNORMAL
AST SERPL-CCNC: 19 U/L (ref 15–37)
BACTERIA URNS QL MICRO: ABNORMAL /HPF
BASOPHILS # BLD: 0.1 K/UL (ref 0–0.1)
BASOPHILS NFR BLD: 1 % (ref 0–1)
BILIRUB SERPL-MCNC: 0.2 MG/DL (ref 0.2–1)
BILIRUB UR QL: NEGATIVE
BUN SERPL-MCNC: 16 MG/DL (ref 6–20)
BUN/CREAT SERPL: 16 (ref 12–20)
CALCIUM SERPL-MCNC: 9.1 MG/DL (ref 8.5–10.1)
CHLORIDE SERPL-SCNC: 105 MMOL/L (ref 97–108)
CO2 SERPL-SCNC: 28 MMOL/L (ref 21–32)
COLOR UR: ABNORMAL
COMMENT, HOLDF: NORMAL
CREAT SERPL-MCNC: 1.03 MG/DL (ref 0.55–1.02)
DIFFERENTIAL METHOD BLD: ABNORMAL
EOSINOPHIL # BLD: 0.1 K/UL (ref 0–0.4)
EOSINOPHIL NFR BLD: 1 % (ref 0–7)
EPITH CASTS URNS QL MICRO: ABNORMAL /LPF
ERYTHROCYTE [DISTWIDTH] IN BLOOD BY AUTOMATED COUNT: 13.6 % (ref 11.5–14.5)
GLOBULIN SER CALC-MCNC: 4 G/DL (ref 2–4)
GLUCOSE SERPL-MCNC: 79 MG/DL (ref 65–100)
GLUCOSE UR STRIP.AUTO-MCNC: NEGATIVE MG/DL
HCG UR QL: NEGATIVE
HCT VFR BLD AUTO: 44.6 % (ref 35–47)
HGB BLD-MCNC: 14.8 G/DL (ref 11.5–16)
HGB UR QL STRIP: ABNORMAL
HYALINE CASTS URNS QL MICRO: ABNORMAL /LPF (ref 0–5)
IMM GRANULOCYTES # BLD AUTO: 0 K/UL (ref 0–0.04)
IMM GRANULOCYTES NFR BLD AUTO: 0 % (ref 0–0.5)
KETONES UR QL STRIP.AUTO: NEGATIVE MG/DL
LEUKOCYTE ESTERASE UR QL STRIP.AUTO: NEGATIVE
LYMPHOCYTES # BLD: 0.8 K/UL (ref 0.8–3.5)
LYMPHOCYTES NFR BLD: 14 % (ref 12–49)
MCH RBC QN AUTO: 30.9 PG (ref 26–34)
MCHC RBC AUTO-ENTMCNC: 33.2 G/DL (ref 30–36.5)
MCV RBC AUTO: 93.1 FL (ref 80–99)
MONOCYTES # BLD: 0.2 K/UL (ref 0–1)
MONOCYTES NFR BLD: 4 % (ref 5–13)
NEUTS SEG # BLD: 4.4 K/UL (ref 1.8–8)
NEUTS SEG NFR BLD: 80 % (ref 32–75)
NITRITE UR QL STRIP.AUTO: NEGATIVE
NRBC # BLD: 0 K/UL (ref 0–0.01)
NRBC BLD-RTO: 0 PER 100 WBC
PH UR STRIP: 6.5 [PH] (ref 5–8)
PLATELET # BLD AUTO: 264 K/UL (ref 150–400)
PMV BLD AUTO: 10.8 FL (ref 8.9–12.9)
POTASSIUM SERPL-SCNC: 3.9 MMOL/L (ref 3.5–5.1)
PROT SERPL-MCNC: 7.7 G/DL (ref 6.4–8.2)
PROT UR STRIP-MCNC: 100 MG/DL
RBC # BLD AUTO: 4.79 M/UL (ref 3.8–5.2)
RBC #/AREA URNS HPF: >100 /HPF (ref 0–5)
RBC MORPH BLD: ABNORMAL
SAMPLES BEING HELD,HOLD: NORMAL
SODIUM SERPL-SCNC: 136 MMOL/L (ref 136–145)
SP GR UR REFRACTOMETRY: 1.02 (ref 1–1.03)
UA: UC IF INDICATED,UAUC: ABNORMAL
UROBILINOGEN UR QL STRIP.AUTO: 1 EU/DL (ref 0.2–1)
WBC # BLD AUTO: 5.6 K/UL (ref 3.6–11)
WBC URNS QL MICRO: ABNORMAL /HPF (ref 0–4)

## 2019-04-15 PROCEDURE — 96374 THER/PROPH/DIAG INJ IV PUSH: CPT

## 2019-04-15 PROCEDURE — 80053 COMPREHEN METABOLIC PANEL: CPT

## 2019-04-15 PROCEDURE — 36415 COLL VENOUS BLD VENIPUNCTURE: CPT

## 2019-04-15 PROCEDURE — 76856 US EXAM PELVIC COMPLETE: CPT

## 2019-04-15 PROCEDURE — 81025 URINE PREGNANCY TEST: CPT

## 2019-04-15 PROCEDURE — 96375 TX/PRO/DX INJ NEW DRUG ADDON: CPT

## 2019-04-15 PROCEDURE — 87086 URINE CULTURE/COLONY COUNT: CPT

## 2019-04-15 PROCEDURE — 74011250636 HC RX REV CODE- 250/636: Performed by: PHYSICIAN ASSISTANT

## 2019-04-15 PROCEDURE — 99283 EMERGENCY DEPT VISIT LOW MDM: CPT

## 2019-04-15 PROCEDURE — 85025 COMPLETE CBC W/AUTO DIFF WBC: CPT

## 2019-04-15 PROCEDURE — 81001 URINALYSIS AUTO W/SCOPE: CPT

## 2019-04-15 PROCEDURE — 76830 TRANSVAGINAL US NON-OB: CPT

## 2019-04-15 PROCEDURE — 74176 CT ABD & PELVIS W/O CONTRAST: CPT

## 2019-04-15 RX ORDER — TAMSULOSIN HYDROCHLORIDE 0.4 MG/1
0.4 CAPSULE ORAL DAILY
Qty: 15 CAP | Refills: 0 | Status: SHIPPED | OUTPATIENT
Start: 2019-04-15 | End: 2019-04-30

## 2019-04-15 RX ORDER — MORPHINE SULFATE 2 MG/ML
4 INJECTION, SOLUTION INTRAMUSCULAR; INTRAVENOUS
Status: COMPLETED | OUTPATIENT
Start: 2019-04-15 | End: 2019-04-15

## 2019-04-15 RX ORDER — OXYCODONE AND ACETAMINOPHEN 5; 325 MG/1; MG/1
1-2 TABLET ORAL
Qty: 12 TAB | Refills: 0 | Status: SHIPPED | OUTPATIENT
Start: 2019-04-15 | End: 2019-04-18

## 2019-04-15 RX ORDER — ONDANSETRON 4 MG/1
4 TABLET, ORALLY DISINTEGRATING ORAL
Qty: 12 TAB | Refills: 0 | Status: SHIPPED | OUTPATIENT
Start: 2019-04-15 | End: 2020-11-30

## 2019-04-15 RX ORDER — ONDANSETRON 2 MG/ML
4 INJECTION INTRAMUSCULAR; INTRAVENOUS
Status: COMPLETED | OUTPATIENT
Start: 2019-04-15 | End: 2019-04-15

## 2019-04-15 RX ADMIN — MORPHINE SULFATE 4 MG: 2 INJECTION, SOLUTION INTRAMUSCULAR; INTRAVENOUS at 09:39

## 2019-04-15 RX ADMIN — ONDANSETRON 4 MG: 2 INJECTION INTRAMUSCULAR; INTRAVENOUS at 09:40

## 2019-04-15 NOTE — ED PROVIDER NOTES
28 yo AAF with complaint of pelvic pain radiating around to the lower back since about 0600, described as sharp without any recognized modifying factors. One episode of vomiting today. Take depot provera so has amenorrhea. No fever, headache, sore throat, cough, rhinorrhea, sneezing, SOB, CP, constipation, diarrhea or dysuria, urinary frequency. Took toradol with minimal improvement. NKDA 
surg hx - Med hx HTN Past Medical History:  
Diagnosis Date  Abnormal Pap smear HPV  Anemia 9/30/2010  
 no problems in this pregnancy  Anemia 9/30/2010  Delivery normal   
 Elevated BP 9/30/2010  Essential hypertension   
 chronic  Gestational hypertension 9/16/2010  Hypertension No past surgical history on file. No family history on file. Social History Socioeconomic History  Marital status: SINGLE Spouse name: Not on file  Number of children: Not on file  Years of education: Not on file  Highest education level: Not on file Occupational History  Not on file Social Needs  Financial resource strain: Not on file  Food insecurity:  
  Worry: Not on file Inability: Not on file  Transportation needs:  
  Medical: Not on file Non-medical: Not on file Tobacco Use  Smoking status: Never Smoker  Smokeless tobacco: Never Used Substance and Sexual Activity  Alcohol use: Yes Comment: occasionaly  Drug use: No  
 Sexual activity: Yes  
  Partners: Male Birth control/protection: Implant Lifestyle  Physical activity:  
  Days per week: Not on file Minutes per session: Not on file  Stress: Not on file Relationships  Social connections:  
  Talks on phone: Not on file Gets together: Not on file Attends [employer] service: Not on file Active member of club or organization: Not on file Attends meetings of clubs or organizations: Not on file Relationship status: Not on file  Intimate partner violence:  
  Fear of current or ex partner: Not on file Emotionally abused: Not on file Physically abused: Not on file Forced sexual activity: Not on file Other Topics Concern  Not on file Social History Narrative ** Merged History Encounter ** ALLERGIES: Patient has no known allergies. Review of Systems Constitutional: Negative. Negative for chills, fatigue and fever. HENT: Negative. Negative for congestion, ear pain, facial swelling, rhinorrhea, sneezing and sore throat. Respiratory: Negative for cough, chest tightness and shortness of breath. Cardiovascular: Negative. Negative for chest pain. Gastrointestinal: Positive for abdominal pain and vomiting. Negative for abdominal distention, constipation, diarrhea and nausea. Genitourinary: Positive for hematuria and pelvic pain. Negative for difficulty urinating, frequency and urgency. Musculoskeletal: Positive for back pain. Skin: Negative for rash. Neurological: Negative for headaches. All other systems reviewed and are negative. Vitals:  
 04/15/19 9892 Pulse: 74 SpO2: 100% Physical Exam  
Constitutional: She is oriented to person, place, and time. She appears well-developed and well-nourished. No distress. Well appearing AAF in NAD HENT:  
Head: Normocephalic and atraumatic. Left Ear: External ear normal.  
Eyes: Pupils are equal, round, and reactive to light. Conjunctivae are normal.  
Neck: Normal range of motion. Neck supple. Cardiovascular: Normal rate, regular rhythm and normal heart sounds. Pulmonary/Chest: Effort normal. No respiratory distress. She has no wheezes. Abdominal: Soft. Bowel sounds are normal. She exhibits no distension. There is no tenderness. There is no rebound. Musculoskeletal: Normal range of motion. Neurological: She is alert and oriented to person, place, and time. Skin: Skin is warm and dry. No ecchymosis, no laceration and no lesion noted. Nursing note and vitals reviewed. MDM Number of Diagnoses or Management Options Right ureteral stone:  
Diagnosis management comments: 30 yo AAF with complaint of pelvic, suprapubic area pain and rt sided flank pain today. Concern for ovarian cyst +/- torsion vs UTI vs appy vs obstructive uropathy amongst others Plan CBC 
CMP Lipase UA 
US pelv Ct abd/pel Analgesia Reassess. Sue Brown Amount and/or Complexity of Data Reviewed Clinical lab tests: ordered and reviewed Tests in the radiology section of CPT®: ordered and reviewed Independent visualization of images, tracings, or specimens: yes Procedures Progress note WBC nml. UA -Leuk and nitrates,  CT + rt ureter stones largest 3 mm. Sue Brown Patient's results have been reviewed with them. Patient and/or family have verbally conveyed their understanding and agreement of the patient's signs, symptoms, diagnosis, treatment and prognosis and additionally agree to follow up as recommended or return to the Emergency Room should their condition change prior to follow-up. Discharge instructions have also been provided to the patient with some educational information regarding their diagnosis as well a list of reasons why they would want to return to the ER prior to their follow-up appointment should their condition change.  Sue Brown

## 2019-04-15 NOTE — LETTER
[address] 
[address] [place] [id number] 
[phone] Work/School Note Date: 4/15/2019 To Whom It May concern: 
 
Melanie Rice was seen and treated today in the emergency room by the following provider(s): 
Attending Provider: Ovidio Cornejo DO Physician Assistant: Shyam GARCIA, [id number]. Melanie Rice may return to work on Wednesday April 17, 2019 Sincerely, Kelli Brown

## 2019-04-15 NOTE — DISCHARGE INSTRUCTIONS
Patient Education        Kidney Stone: Care Instructions  Your Care Instructions    Kidney stones are formed when salts, minerals, and other substances normally found in the urine clump together. They can be as small as grains of sand or, rarely, as large as golf balls. While the stone is traveling through the ureter, which is the tube that carries urine from the kidney to the bladder, you will probably feel pain. The pain may be mild or very severe. You may also have some blood in your urine. As soon as the stone reaches the bladder, any intense pain should go away. If a stone is too large to pass on its own, you may need a medical procedure to help you pass the stone. The doctor has checked you carefully, but problems can develop later. If you notice any problems or new symptoms, get medical treatment right away. Follow-up care is a key part of your treatment and safety. Be sure to make and go to all appointments, and call your doctor if you are having problems. It's also a good idea to know your test results and keep a list of the medicines you take. How can you care for yourself at home? · Drink plenty of fluids, enough so that your urine is light yellow or clear like water. If you have kidney, heart, or liver disease and have to limit fluids, talk with your doctor before you increase the amount of fluids you drink. · Take pain medicines exactly as directed. Call your doctor if you think you are having a problem with your medicine. ? If the doctor gave you a prescription medicine for pain, take it as prescribed. ? If you are not taking a prescription pain medicine, ask your doctor if you can take an over-the-counter medicine. Read and follow all instructions on the label. · Your doctor may ask you to strain your urine so that you can collect your kidney stone when it passes. You can use a kitchen strainer or a tea strainer to catch the stone.  Store it in a plastic bag until you see your doctor again.  Preventing future kidney stones  Some changes in your diet may help prevent kidney stones. Depending on the cause of your stones, your doctor may recommend that you:  · Drink plenty of fluids, enough so that your urine is light yellow or clear like water. If you have kidney, heart, or liver disease and have to limit fluids, talk with your doctor before you increase the amount of fluids you drink. · Limit coffee, tea, and alcohol. Also avoid grapefruit juice. · Do not take more than the recommended daily dose of vitamins C and D.  · Avoid antacids such as Gaviscon, Maalox, Mylanta, or Tums. · Limit the amount of salt (sodium) in your diet. · Eat a balanced diet that is not too high in protein. · Limit foods that are high in a substance called oxalate, which can cause kidney stones. These foods include dark green vegetables, rhubarb, chocolate, wheat bran, nuts, cranberries, and beans. When should you call for help? Call your doctor now or seek immediate medical care if:    · You cannot keep down fluids.     · Your pain gets worse.     · You have a fever or chills.     · You have new or worse pain in your back just below your rib cage (the flank area).     · You have new or more blood in your urine.    Watch closely for changes in your health, and be sure to contact your doctor if:    · You do not get better as expected. Where can you learn more? Go to http://parish-wilman.info/. Enter B303 in the search box to learn more about \"Kidney Stone: Care Instructions. \"  Current as of: March 14, 2018  Content Version: 11.9  © 7775-3833 [employer]. Care instructions adapted under license by [employer] (which disclaims liability or warranty for this information).  If you have questions about a medical condition or this instruction, always ask your healthcare professional. [address] any warranty or liability for your use of this information.

## 2019-04-15 NOTE — ED TRIAGE NOTES
Pt states that she woke this morning with a sharp lower pelvic area with some blood noted in her urine. Pt states that she has no fever but vomited once this morning.

## 2019-04-16 LAB
BACTERIA SPEC CULT: NORMAL
CC UR VC: NORMAL
SERVICE CMNT-IMP: NORMAL

## 2020-10-13 ENCOUNTER — APPOINTMENT (OUTPATIENT)
Dept: CT IMAGING | Age: 30
End: 2020-10-13
Attending: NURSE PRACTITIONER
Payer: COMMERCIAL

## 2020-10-13 ENCOUNTER — HOSPITAL ENCOUNTER (EMERGENCY)
Age: 30
Discharge: HOME | End: 2020-10-13
Attending: EMERGENCY MEDICINE
Payer: COMMERCIAL

## 2020-10-13 VITALS
HEIGHT: 63 IN | DIASTOLIC BLOOD PRESSURE: 105 MMHG | WEIGHT: 168 LBS | HEART RATE: 88 BPM | TEMPERATURE: 98.5 F | BODY MASS INDEX: 29.77 KG/M2 | RESPIRATION RATE: 18 BRPM | SYSTOLIC BLOOD PRESSURE: 176 MMHG | OXYGEN SATURATION: 99 %

## 2020-10-13 DIAGNOSIS — I10 ESSENTIAL HYPERTENSION: ICD-10-CM

## 2020-10-13 DIAGNOSIS — N13.2 URETERAL STONE WITH HYDRONEPHROSIS: Primary | ICD-10-CM

## 2020-10-13 LAB
ALBUMIN SERPL-MCNC: 3.6 G/DL (ref 3.5–5)
ALBUMIN/GLOB SERPL: 0.9 {RATIO} (ref 1.1–2.2)
ALP SERPL-CCNC: 165 U/L (ref 45–117)
ALT SERPL-CCNC: 18 U/L (ref 12–78)
ANION GAP SERPL CALC-SCNC: 7 MMOL/L (ref 5–15)
APPEARANCE UR: ABNORMAL
AST SERPL-CCNC: 28 U/L (ref 15–37)
BACTERIA URNS QL MICRO: NEGATIVE /HPF
BASOPHILS # BLD: 0 K/UL (ref 0–0.1)
BASOPHILS NFR BLD: 1 % (ref 0–1)
BILIRUB SERPL-MCNC: 0.5 MG/DL (ref 0.2–1)
BILIRUB UR QL: NEGATIVE
BUN SERPL-MCNC: 10 MG/DL (ref 6–20)
BUN/CREAT SERPL: 11 (ref 12–20)
CALCIUM SERPL-MCNC: 8.1 MG/DL (ref 8.5–10.1)
CHLORIDE SERPL-SCNC: 104 MMOL/L (ref 97–108)
CO2 SERPL-SCNC: 27 MMOL/L (ref 21–32)
COLOR UR: ABNORMAL
COMMENT, HOLDF: NORMAL
CREAT SERPL-MCNC: 0.89 MG/DL (ref 0.55–1.02)
DIFFERENTIAL METHOD BLD: ABNORMAL
EOSINOPHIL # BLD: 0 K/UL (ref 0–0.4)
EOSINOPHIL NFR BLD: 0 % (ref 0–7)
EPITH CASTS URNS QL MICRO: ABNORMAL /LPF
ERYTHROCYTE [DISTWIDTH] IN BLOOD BY AUTOMATED COUNT: 14.8 % (ref 11.5–14.5)
GLOBULIN SER CALC-MCNC: 3.8 G/DL (ref 2–4)
GLUCOSE SERPL-MCNC: 99 MG/DL (ref 65–100)
GLUCOSE UR STRIP.AUTO-MCNC: NEGATIVE MG/DL
HCG UR QL: NEGATIVE
HCT VFR BLD AUTO: 43.2 % (ref 35–47)
HGB BLD-MCNC: 14.8 G/DL (ref 11.5–16)
HGB UR QL STRIP: ABNORMAL
IMM GRANULOCYTES # BLD AUTO: 0 K/UL (ref 0–0.04)
IMM GRANULOCYTES NFR BLD AUTO: 0 % (ref 0–0.5)
KETONES UR QL STRIP.AUTO: ABNORMAL MG/DL
LEUKOCYTE ESTERASE UR QL STRIP.AUTO: NEGATIVE
LYMPHOCYTES # BLD: 1.1 K/UL (ref 0.8–3.5)
LYMPHOCYTES NFR BLD: 20 % (ref 12–49)
MCH RBC QN AUTO: 30.8 PG (ref 26–34)
MCHC RBC AUTO-ENTMCNC: 34.3 G/DL (ref 30–36.5)
MCV RBC AUTO: 89.8 FL (ref 80–99)
MONOCYTES # BLD: 0.2 K/UL (ref 0–1)
MONOCYTES NFR BLD: 4 % (ref 5–13)
NEUTS SEG # BLD: 3.8 K/UL (ref 1.8–8)
NEUTS SEG NFR BLD: 75 % (ref 32–75)
NITRITE UR QL STRIP.AUTO: NEGATIVE
NRBC # BLD: 0 K/UL (ref 0–0.01)
NRBC BLD-RTO: 0 PER 100 WBC
PH UR STRIP: 7.5 [PH] (ref 5–8)
PLATELET # BLD AUTO: 282 K/UL (ref 150–400)
PMV BLD AUTO: 12.5 FL (ref 8.9–12.9)
POTASSIUM SERPL-SCNC: 4 MMOL/L (ref 3.5–5.1)
PROT SERPL-MCNC: 7.4 G/DL (ref 6.4–8.2)
PROT UR STRIP-MCNC: ABNORMAL MG/DL
RBC # BLD AUTO: 4.81 M/UL (ref 3.8–5.2)
RBC #/AREA URNS HPF: ABNORMAL /HPF (ref 0–5)
SAMPLES BEING HELD,HOLD: NORMAL
SODIUM SERPL-SCNC: 138 MMOL/L (ref 136–145)
SP GR UR REFRACTOMETRY: 1.01 (ref 1–1.03)
UA: UC IF INDICATED,UAUC: ABNORMAL
UROBILINOGEN UR QL STRIP.AUTO: 0.2 EU/DL (ref 0.2–1)
WBC # BLD AUTO: 5.1 K/UL (ref 3.6–11)
WBC URNS QL MICRO: ABNORMAL /HPF (ref 0–4)

## 2020-10-13 PROCEDURE — 74176 CT ABD & PELVIS W/O CONTRAST: CPT

## 2020-10-13 PROCEDURE — 74011250637 HC RX REV CODE- 250/637: Performed by: NURSE PRACTITIONER

## 2020-10-13 PROCEDURE — 74011250636 HC RX REV CODE- 250/636: Performed by: NURSE PRACTITIONER

## 2020-10-13 PROCEDURE — 81025 URINE PREGNANCY TEST: CPT

## 2020-10-13 PROCEDURE — 99284 EMERGENCY DEPT VISIT MOD MDM: CPT

## 2020-10-13 PROCEDURE — 80053 COMPREHEN METABOLIC PANEL: CPT

## 2020-10-13 PROCEDURE — 96374 THER/PROPH/DIAG INJ IV PUSH: CPT

## 2020-10-13 PROCEDURE — 96375 TX/PRO/DX INJ NEW DRUG ADDON: CPT

## 2020-10-13 PROCEDURE — 81001 URINALYSIS AUTO W/SCOPE: CPT

## 2020-10-13 PROCEDURE — 36415 COLL VENOUS BLD VENIPUNCTURE: CPT

## 2020-10-13 PROCEDURE — 85025 COMPLETE CBC W/AUTO DIFF WBC: CPT

## 2020-10-13 RX ORDER — ONDANSETRON 2 MG/ML
4 INJECTION INTRAMUSCULAR; INTRAVENOUS
Status: COMPLETED | OUTPATIENT
Start: 2020-10-13 | End: 2020-10-13

## 2020-10-13 RX ORDER — SODIUM CHLORIDE 0.9 % (FLUSH) 0.9 %
5-40 SYRINGE (ML) INJECTION EVERY 8 HOURS
Status: DISCONTINUED | OUTPATIENT
Start: 2020-10-13 | End: 2020-10-13 | Stop reason: HOSPADM

## 2020-10-13 RX ORDER — IBUPROFEN 600 MG/1
600 TABLET ORAL
Qty: 20 TAB | Refills: 0 | Status: SHIPPED | OUTPATIENT
Start: 2020-10-13 | End: 2020-11-30

## 2020-10-13 RX ORDER — KETOROLAC TROMETHAMINE 30 MG/ML
30 INJECTION, SOLUTION INTRAMUSCULAR; INTRAVENOUS
Status: COMPLETED | OUTPATIENT
Start: 2020-10-13 | End: 2020-10-13

## 2020-10-13 RX ORDER — CLONIDINE HYDROCHLORIDE 0.1 MG/1
0.2 TABLET ORAL
Status: COMPLETED | OUTPATIENT
Start: 2020-10-13 | End: 2020-10-13

## 2020-10-13 RX ORDER — SODIUM CHLORIDE 0.9 % (FLUSH) 0.9 %
5-40 SYRINGE (ML) INJECTION AS NEEDED
Status: DISCONTINUED | OUTPATIENT
Start: 2020-10-13 | End: 2020-10-13 | Stop reason: HOSPADM

## 2020-10-13 RX ORDER — AMLODIPINE BESYLATE 5 MG/1
5 TABLET ORAL DAILY
Qty: 14 TAB | Refills: 0 | Status: SHIPPED | OUTPATIENT
Start: 2020-10-13

## 2020-10-13 RX ADMIN — KETOROLAC TROMETHAMINE 30 MG: 30 INJECTION, SOLUTION INTRAMUSCULAR; INTRAVENOUS at 14:03

## 2020-10-13 RX ADMIN — CLONIDINE HYDROCHLORIDE 0.2 MG: 0.1 TABLET ORAL at 14:23

## 2020-10-13 RX ADMIN — ONDANSETRON 4 MG: 2 INJECTION INTRAMUSCULAR; INTRAVENOUS at 14:05

## 2020-10-13 NOTE — ED NOTES
Pt presents to ED ambulatory complaining of right sided pelvic pain since this morning. Pt denies hx of fibroids and states she has hx of kidney stones 2 years ago. Pt denies bleeding, states she got depo two weeks ago. Pt is alert and oriented x 4, RR even and unlabored, skin is warm and dry. Assessment completed and pt updated on plan of care. Call bell in reach. Emergency Department Nursing Plan of Care       The Nursing Plan of Care is developed from the Nursing assessment and Emergency Department Attending provider initial evaluation. The plan of care may be reviewed in the ED Provider note.     The Plan of Care was developed with the following considerations:   Patient / Family readiness to learn indicated by:verbalized understanding  Persons(s) to be included in education: patient  Barriers to Learning/Limitations:No    Signed     Bunny Negron RN    10/13/2020   1:20 PM

## 2020-10-13 NOTE — ED NOTES
Pt states she is supposed to take blood pressure medications but has not due to not having PCP. Pt states she has been without blood pressure medications for more than a month.  Pt reports taking lisinopril and \"something else\"

## 2020-10-13 NOTE — LETTER
[facility] EMERGENCY DEPT 
[address] [id number] 
[phone] Work/School Note Date: 10/13/2020 To Whom It May concern: 
 
Alexei Flores was seen and treated today in the emergency room by the following provider(s): 
Attending Provider: Wendy Ybarra MD 
Nurse Practitioner: Cinthya Leary NP. Alexei Flores may return to work on October 15. Sincerely, Maira Cuadra NP

## 2020-10-13 NOTE — ED TRIAGE NOTES
Pelvic pain starting this morning. Denies other symptoms. No bleeding. Last received depo two weeks ago.

## 2020-10-13 NOTE — ED NOTES
Discharge instructions were given to the patient by Kye Flowers RN. The patient left the Emergency Department ambulatory, alert and oriented and in no acute distress with 2 prescriptions. The patient was encouraged to call or return to the ED for worsening issues or problems and was encouraged to schedule a follow up appointment for continuing care. Pt left with doctors note. The patient verbalized understanding of discharge instructions and prescriptions, all questions were answered. The patient has no further concerns at this time.

## 2020-10-13 NOTE — DISCHARGE INSTRUCTIONS
Patient Education        DASH Diet: Care Instructions  Your Care Instructions     The DASH diet is an eating plan that can help lower your blood pressure. DASH stands for Dietary Approaches to Stop Hypertension. Hypertension is high blood pressure. The DASH diet focuses on eating foods that are high in calcium, potassium, and magnesium. These nutrients can lower blood pressure. The foods that are highest in these nutrients are fruits, vegetables, low-fat dairy products, nuts, seeds, and legumes. But taking calcium, potassium, and magnesium supplements instead of eating foods that are high in those nutrients does not have the same effect. The DASH diet also includes whole grains, fish, and poultry. The DASH diet is one of several lifestyle changes your doctor may recommend to lower your high blood pressure. Your doctor may also want you to decrease the amount of sodium in your diet. Lowering sodium while following the DASH diet can lower blood pressure even further than just the DASH diet alone. Follow-up care is a key part of your treatment and safety. Be sure to make and go to all appointments, and call your doctor if you are having problems. It's also a good idea to know your test results and keep a list of the medicines you take. How can you care for yourself at home? Following the DASH diet  · Eat 4 to 5 servings of fruit each day. A serving is 1 medium-sized piece of fruit, ½ cup chopped or canned fruit, 1/4 cup dried fruit, or 4 ounces (½ cup) of fruit juice. Choose fruit more often than fruit juice. · Eat 4 to 5 servings of vegetables each day. A serving is 1 cup of lettuce or raw leafy vegetables, ½ cup of chopped or cooked vegetables, or 4 ounces (½ cup) of vegetable juice. Choose vegetables more often than vegetable juice. · Get 2 to 3 servings of low-fat and fat-free dairy each day. A serving is 8 ounces of milk, 1 cup of yogurt, or 1 ½ ounces of cheese. · Eat 6 to 8 servings of grains each day.  A serving is 1 slice of bread, 1 ounce of dry cereal, or ½ cup of cooked rice, pasta, or cooked cereal. Try to choose whole-grain products as much as possible. · Limit lean meat, poultry, and fish to 2 servings each day. A serving is 3 ounces, about the size of a deck of cards. · Eat 4 to 5 servings of nuts, seeds, and legumes (cooked dried beans, lentils, and split peas) each week. A serving is 1/3 cup of nuts, 2 tablespoons of seeds, or ½ cup of cooked beans or peas. · Limit fats and oils to 2 to 3 servings each day. A serving is 1 teaspoon of vegetable oil or 2 tablespoons of salad dressing. · Limit sweets and added sugars to 5 servings or less a week. A serving is 1 tablespoon jelly or jam, ½ cup sorbet, or 1 cup of lemonade. · Eat less than 2,300 milligrams (mg) of sodium a day. If you limit your sodium to 1,500 mg a day, you can lower your blood pressure even more. Tips for success  · Start small. Do not try to make dramatic changes to your diet all at once. You might feel that you are missing out on your favorite foods and then be more likely to not follow the plan. Make small changes, and stick with them. Once those changes become habit, add a few more changes. · Try some of the following:  ? Make it a goal to eat a fruit or vegetable at every meal and at snacks. This will make it easy to get the recommended amount of fruits and vegetables each day. ? Try yogurt topped with fruit and nuts for a snack or healthy dessert. ? Add lettuce, tomato, cucumber, and onion to sandwiches. ? Combine a ready-made pizza crust with low-fat mozzarella cheese and lots of vegetable toppings. Try using tomatoes, squash, spinach, broccoli, carrots, cauliflower, and onions. ? Have a variety of cut-up vegetables with a low-fat dip as an appetizer instead of chips and dip. ? Sprinkle sunflower seeds or chopped almonds over salads. Or try adding chopped walnuts or almonds to cooked vegetables.   ? Try some vegetarian meals using beans and peas. Add garbanzo or kidney beans to salads. Make burritos and tacos with mashed nguyễn beans or black beans. Where can you learn more? Go to http://www.ramos.com/  Enter H967 in the search box to learn more about \"DASH Diet: Care Instructions. \"  Current as of: December 16, 2019               Content Version: 12.6  © 2006-2020 [employer]. Care instructions adapted under license by [employer] (which disclaims liability or warranty for this information). If you have questions about a medical condition or this instruction, always ask your healthcare professional. [address] any warranty or liability for your use of this information. Patient Education        Kidney Stone: Care Instructions  Your Care Instructions     Kidney stones are formed when salts, minerals, and other substances normally found in the urine clump together. They can be as small as grains of sand or, rarely, as large as golf balls. While the stone is traveling through the ureter, which is the tube that carries urine from the kidney to the bladder, you will probably feel pain. The pain may be mild or very severe. You may also have some blood in your urine. As soon as the stone reaches the bladder, any intense pain should go away. If a stone is too large to pass on its own, you may need a medical procedure to help you pass the stone. The doctor has checked you carefully, but problems can develop later. If you notice any problems or new symptoms, get medical treatment right away. Follow-up care is a key part of your treatment and safety. Be sure to make and go to all appointments, and call your doctor if you are having problems. It's also a good idea to know your test results and keep a list of the medicines you take. How can you care for yourself at home? · Drink plenty of fluids, enough so that your urine is light yellow or clear like water.  If you have kidney, heart, or liver disease and have to limit fluids, talk with your doctor before you increase the amount of fluids you drink. · Take pain medicines exactly as directed. Call your doctor if you think you are having a problem with your medicine. ? If the doctor gave you a prescription medicine for pain, take it as prescribed. ? If you are not taking a prescription pain medicine, ask your doctor if you can take an over-the-counter medicine. Read and follow all instructions on the label. · Your doctor may ask you to strain your urine so that you can collect your kidney stone when it passes. You can use a kitchen strainer or a tea strainer to catch the stone. Store it in a plastic bag until you see your doctor again. Preventing future kidney stones  Some changes in your diet may help prevent kidney stones. Depending on the cause of your stones, your doctor may recommend that you:  · Drink plenty of fluids, enough so that your urine is light yellow or clear like water. If you have kidney, heart, or liver disease and have to limit fluids, talk with your doctor before you increase the amount of fluids you drink. · Limit coffee, tea, and alcohol. Also avoid grapefruit juice. · Do not take more than the recommended daily dose of vitamins C and D.  · Avoid antacids such as Gaviscon, Maalox, Mylanta, or Tums. · Limit the amount of salt (sodium) in your diet. · Eat a balanced diet that is not too high in protein. · Limit foods that are high in a substance called oxalate, which can cause kidney stones. These foods include dark green vegetables, rhubarb, chocolate, wheat bran, nuts, cranberries, and beans. When should you call for help?    Call your doctor now or seek immediate medical care if:    · You cannot keep down fluids.     · Your pain gets worse.     · You have a fever or chills.     · You have new or worse pain in your back just below your rib cage (the flank area).     · You have new or more blood in your urine. Watch closely for changes in your health, and be sure to contact your doctor if:    · You do not get better as expected. Where can you learn more? Go to http://www.gray.com/  Enter F047 in the search box to learn more about \"Kidney Stone: Care Instructions. \"  Current as of: April 15, 2020               Content Version: 12.6  © 6639-7379 [employer]. Care instructions adapted under license by [employer] (which disclaims liability or warranty for this information). If you have questions about a medical condition or this instruction, always ask your healthcare professional. [place] [address] any warranty or liability for your use of this information.

## 2020-10-14 NOTE — ED PROVIDER NOTES
EMERGENCY DEPARTMENT HISTORY AND PHYSICAL EXAM    Date: 10/13/2020  Patient Name: Kiet Schilling    History of Presenting Illness     Chief Complaint   Patient presents with    Pelvic Pain         History Provided By: Patient    Chief Complaint: pelvic pain  Duration:10 am today  Timing:  Acute  Location: RLQ  Quality: Sharp  Severity: 10 out of 10  Modifying Factors: none  Associated Symptoms: denies any other associated signs or symptoms      HPI: Kiet Schilling is a 34 y.o. female with a PMH of kidney stone who presents with RLQ pain onset at 10 am today. Pain sharp intermittent. Denies fever n/v/d. Denies vaginal discharge. or bleeding. Denies fever. PCP: None    Current Outpatient Medications   Medication Sig Dispense Refill    amLODIPine (Norvasc) 5 mg tablet Take 1 Tab by mouth daily. 14 Tab 0    ibuprofen (MOTRIN) 600 mg tablet Take 1 Tab by mouth every six (6) hours as needed for Pain. 20 Tab 0    medroxyPROGESTERone (DEPO-PROVERA) 150 mg/mL injection 150 mg by IntraMUSCular route once.  ondansetron (ZOFRAN ODT) 4 mg disintegrating tablet Take 1 Tab by mouth every eight (8) hours as needed for Nausea. 12 Tab 0    lidocaine (LIDODERM) 5 % Apply patch to the affected area for 12 hours a day and remove for 12 hours a day. 5 Each 0    lisinopril-hydrochlorothiazide (PRINZIDE, ZESTORETIC) 10-12.5 mg per tablet Take 1 Tab by mouth daily. 20 Tab 0       Past History     Past Medical History:  Past Medical History:   Diagnosis Date    Abnormal Pap smear     HPV    Anemia 9/30/2010    no problems in this pregnancy    Anemia 9/30/2010    Delivery normal     Elevated BP 9/30/2010    Essential hypertension     chronic    Gestational hypertension 9/16/2010    Hypertension        Past Surgical History:  History reviewed. No pertinent surgical history. Family History:  History reviewed. No pertinent family history.     Social History:  Social History     Tobacco Use    Smoking status: Never Smoker  Smokeless tobacco: Never Used   Substance Use Topics    Alcohol use: Yes     Comment: occasionaly     Drug use: Yes     Types: Marijuana     Comment: daily       Allergies:  No Known Allergies      Review of Systems   Review of Systems   Constitutional: Negative for fatigue and fever. Respiratory: Negative for shortness of breath and wheezing. Cardiovascular: Negative for chest pain and palpitations. Gastrointestinal: Negative for abdominal pain. Genitourinary: Positive for pelvic pain. Musculoskeletal: Negative for arthralgias, myalgias, neck pain and neck stiffness. Skin: Negative for pallor and rash. Neurological: Negative for dizziness, tremors, weakness and headaches. All other systems reviewed and are negative. Physical Exam     Vitals:    10/13/20 1257 10/13/20 1503 10/13/20 1504 10/13/20 1540   BP: (!) 191/131 (!) 173/121  (!) 176/105   Pulse: 91   88   Resp: 16   18   Temp: 98.5 °F (36.9 °C)      SpO2: 99%  99% 99%   Weight: 76.2 kg (168 lb)      Height: 5' 3\" (1.6 m)        Physical Exam  Vitals signs and nursing note reviewed. Constitutional:       General: She is not in acute distress. Appearance: She is well-developed. HENT:      Head: Normocephalic and atraumatic. Right Ear: External ear normal.      Left Ear: External ear normal.      Nose: Nose normal.   Eyes:      Conjunctiva/sclera: Conjunctivae normal.   Neck:      Musculoskeletal: Normal range of motion and neck supple. Cardiovascular:      Rate and Rhythm: Normal rate and regular rhythm. Heart sounds: Normal heart sounds. Pulmonary:      Effort: Pulmonary effort is normal. No respiratory distress. Breath sounds: Normal breath sounds. No wheezing. Abdominal:      General: Bowel sounds are normal.      Palpations: Abdomen is soft. Tenderness: There is abdominal tenderness (RLQ). Musculoskeletal: Normal range of motion. Lymphadenopathy:      Cervical: No cervical adenopathy. Skin:     General: Skin is warm and dry. Findings: No rash. Neurological:      Mental Status: She is alert and oriented to person, place, and time. Cranial Nerves: No cranial nerve deficit. Coordination: Coordination normal.   Psychiatric:         Behavior: Behavior normal.         Thought Content: Thought content normal.         Judgment: Judgment normal.           Diagnostic Study Results     Labs -     Recent Results (from the past 12 hour(s))   HCG URINE, QL. - POC    Collection Time: 10/13/20  1:23 PM   Result Value Ref Range    Pregnancy test,urine (POC) Negative NEG     URINALYSIS W/ REFLEX CULTURE    Collection Time: 10/13/20  1:36 PM    Specimen: Urine   Result Value Ref Range    Color YELLOW/STRAW      Appearance TURBID (A) CLEAR      Specific gravity 1.015 1.003 - 1.030      pH (UA) 7.5 5.0 - 8.0      Protein TRACE (A) NEG mg/dL    Glucose Negative NEG mg/dL    Ketone TRACE (A) NEG mg/dL    Bilirubin Negative NEG      Blood LARGE (A) NEG      Urobilinogen 0.2 0.2 - 1.0 EU/dL    Nitrites Negative NEG      Leukocyte Esterase Negative NEG      WBC 0-4 0 - 4 /hpf    RBC 20-50 0 - 5 /hpf    Epithelial cells FEW FEW /lpf    Bacteria Negative NEG /hpf    UA:UC IF INDICATED CULTURE NOT INDICATED BY UA RESULT CNI     SAMPLES BEING HELD    Collection Time: 10/13/20  1:36 PM   Result Value Ref Range    SAMPLES BEING HELD 1LAV,1PST     COMMENT        Add-on orders for these samples will be processed based on acceptable specimen integrity and analyte stability, which may vary by analyte.    CBC WITH AUTOMATED DIFF    Collection Time: 10/13/20  1:36 PM   Result Value Ref Range    WBC 5.1 3.6 - 11.0 K/uL    RBC 4.81 3.80 - 5.20 M/uL    HGB 14.8 11.5 - 16.0 g/dL    HCT 43.2 35.0 - 47.0 %    MCV 89.8 80.0 - 99.0 FL    MCH 30.8 26.0 - 34.0 PG    MCHC 34.3 30.0 - 36.5 g/dL    RDW 14.8 (H) 11.5 - 14.5 %    PLATELET 914 446 - 009 K/uL    MPV 12.5 8.9 - 12.9 FL    NRBC 0.0 0  WBC    ABSOLUTE NRBC 0.00 0.00 - 0.01 K/uL    NEUTROPHILS 75 32 - 75 %    LYMPHOCYTES 20 12 - 49 %    MONOCYTES 4 (L) 5 - 13 %    EOSINOPHILS 0 0 - 7 %    BASOPHILS 1 0 - 1 %    IMMATURE GRANULOCYTES 0 0.0 - 0.5 %    ABS. NEUTROPHILS 3.8 1.8 - 8.0 K/UL    ABS. LYMPHOCYTES 1.1 0.8 - 3.5 K/UL    ABS. MONOCYTES 0.2 0.0 - 1.0 K/UL    ABS. EOSINOPHILS 0.0 0.0 - 0.4 K/UL    ABS. BASOPHILS 0.0 0.0 - 0.1 K/UL    ABS. IMM. GRANS. 0.0 0.00 - 0.04 K/UL    DF AUTOMATED     METABOLIC PANEL, COMPREHENSIVE    Collection Time: 10/13/20  2:09 PM   Result Value Ref Range    Sodium 138 136 - 145 mmol/L    Potassium 4.0 3.5 - 5.1 mmol/L    Chloride 104 97 - 108 mmol/L    CO2 27 21 - 32 mmol/L    Anion gap 7 5 - 15 mmol/L    Glucose 99 65 - 100 mg/dL    BUN 10 6 - 20 MG/DL    Creatinine 0.89 0.55 - 1.02 MG/DL    BUN/Creatinine ratio 11 (L) 12 - 20      GFR est AA >60 >60 ml/min/1.73m2    GFR est non-AA >60 >60 ml/min/1.73m2    Calcium 8.1 (L) 8.5 - 10.1 MG/DL    Bilirubin, total 0.5 0.2 - 1.0 MG/DL    ALT (SGPT) 18 12 - 78 U/L    AST (SGOT) 28 15 - 37 U/L    Alk. phosphatase 165 (H) 45 - 117 U/L    Protein, total 7.4 6.4 - 8.2 g/dL    Albumin 3.6 3.5 - 5.0 g/dL    Globulin 3.8 2.0 - 4.0 g/dL    A-G Ratio 0.9 (L) 1.1 - 2.2         Radiologic Studies -   CT ABD PELV WO CONT   Final Result   IMPRESSION:      1. Right hydronephrosis with 3 mm calculus proximal right ureter. 2. Bilateral nephrolithiasis. CT Results  (Last 48 hours)               10/13/20 1451  CT ABD PELV WO CONT Final result    Impression:  IMPRESSION:       1. Right hydronephrosis with 3 mm calculus proximal right ureter. 2. Bilateral nephrolithiasis. Narrative:  EXAM: CT ABD PELV WO CONT       INDICATION: HX stone R ureteral , pelvic pain since this morning. COMPARISON: 2019       CONTRAST:  None. TECHNIQUE:    Thin axial images were obtained through the abdomen and pelvis. Coronal and   sagittal reformats were generated. Oral contrast was not administered.  CT dose reduction was achieved through use of a standardized protocol tailored for this   examination and automatic exposure control for dose modulation. The absence of intravenous contrast material reduces the sensitivity for   evaluation of the vasculature and solid organs. FINDINGS:    LOWER THORAX: No significant abnormality in the incidentally imaged lower chest.   LIVER: No mass. BILIARY TREE: Gallbladder is within normal limits. CBD is not dilated. SPLEEN: within normal limits. PANCREAS: No focal abnormality. ADRENALS: Unremarkable. KIDNEYS/URETERS: Bilateral nephrolithiasis. Right hydronephrosis with 4 mm   calculus proximal right ureter. STOMACH: Unremarkable. SMALL BOWEL: No dilatation or wall thickening. COLON: No dilatation or wall thickening. APPENDIX: Normal.   PERITONEUM: No ascites or pneumoperitoneum. RETROPERITONEUM: No lymphadenopathy or aortic aneurysm. REPRODUCTIVE ORGANS: Normal.   URINARY BLADDER: No mass or calculus. BONES: No destructive bone lesion. ABDOMINAL WALL: No mass or hernia. ADDITIONAL COMMENTS: N/A               CXR Results  (Last 48 hours)    None            Medical Decision Making   I am the first provider for this patient. I reviewed the vital signs, available nursing notes, past medical history, past surgical history, family history and social history. Vital Signs-Reviewed the patient's vital signs. Records Reviewed: Nursing Notes            Disposition:  Home  Will start on norvasc for HTN. Patient has not been taking her antihypertensive. Lisinopril/HCTZ  DISCHARGE NOTE:           Care plan outlined and precautions discussed. Patient has no new complaints, changes, or physical findings. Results of tests were reviewed with the patient. All medications were reviewed with the patient; will d/c home with motrin. All of pt's questions and concerns were addressed.  Patient was instructed and agrees to follow up with PCP  urology, as well as to return to the ED upon further deterioration. Patient is ready to go home. Follow-up Information     Follow up With Specialties Details Why [address]  In 1 week  [address]  [place], [address] [facility] [address]    [facility]  In 1 week  [address]  [place] [facility] [phone]          Discharge Medication List as of 10/13/2020  3:47 PM      START taking these medications    Details   amLODIPine (Norvasc) 5 mg tablet Take 1 Tab by mouth daily. , Normal, Disp-14 Tab,R-0      ibuprofen (MOTRIN) 600 mg tablet Take 1 Tab by mouth every six (6) hours as needed for Pain., Normal, Disp-20 Tab,R-0         CONTINUE these medications which have NOT CHANGED    Details   medroxyPROGESTERone (DEPO-PROVERA) 150 mg/mL injection 150 mg by IntraMUSCular route once., Historical Med      ondansetron (ZOFRAN ODT) 4 mg disintegrating tablet Take 1 Tab by mouth every eight (8) hours as needed for Nausea. , Print, Disp-12 Tab, R-0      lidocaine (LIDODERM) 5 % Apply patch to the affected area for 12 hours a day and remove for 12 hours a day., Print, Disp-5 Each, R-0      lisinopril-hydrochlorothiazide (PRINZIDE, ZESTORETIC) 10-12.5 mg per tablet Take 1 Tab by mouth daily. , Print, Disp-20 Tab, R-0             Provider Notes (Medical Decision Making):   DDX appendicitis kidney stone UTI   HTN   Procedures:  Procedures    Please note that this dictation was completed with Dragon, computer voice recognition software. Quite often unanticipated grammatical, syntax, homophones, and other interpretive errors are inadvertently transcribed by the computer software. Please disregard these errors. Additionally, please excuse any errors that have escaped final proofreading. Diagnosis     Clinical Impression:   1. Ureteral stone with hydronephrosis    2.  Essential hypertension

## 2020-11-11 ENCOUNTER — APPOINTMENT (OUTPATIENT)
Dept: CT IMAGING | Age: 30
End: 2020-11-11
Attending: EMERGENCY MEDICINE
Payer: COMMERCIAL

## 2020-11-11 ENCOUNTER — HOSPITAL ENCOUNTER (EMERGENCY)
Age: 30
Discharge: HOME | End: 2020-11-11
Attending: EMERGENCY MEDICINE
Payer: COMMERCIAL

## 2020-11-11 VITALS
DIASTOLIC BLOOD PRESSURE: 99 MMHG | HEIGHT: 63 IN | HEART RATE: 97 BPM | RESPIRATION RATE: 18 BRPM | BODY MASS INDEX: 29.77 KG/M2 | OXYGEN SATURATION: 100 % | SYSTOLIC BLOOD PRESSURE: 154 MMHG | TEMPERATURE: 98.2 F | WEIGHT: 168 LBS

## 2020-11-11 DIAGNOSIS — N20.0 KIDNEY STONE ON RIGHT SIDE: Primary | ICD-10-CM

## 2020-11-11 LAB
ALBUMIN SERPL-MCNC: 3.8 G/DL (ref 3.5–5)
ALBUMIN/GLOB SERPL: 1 {RATIO} (ref 1.1–2.2)
ALP SERPL-CCNC: 181 U/L (ref 45–117)
ALT SERPL-CCNC: 19 U/L (ref 12–78)
AMORPH CRY URNS QL MICRO: ABNORMAL
ANION GAP SERPL CALC-SCNC: 11 MMOL/L (ref 5–15)
APPEARANCE UR: ABNORMAL
AST SERPL-CCNC: 16 U/L (ref 15–37)
BACTERIA URNS QL MICRO: NEGATIVE /HPF
BASOPHILS # BLD: 0 K/UL (ref 0–0.1)
BASOPHILS NFR BLD: 0 % (ref 0–1)
BILIRUB SERPL-MCNC: 0.4 MG/DL (ref 0.2–1)
BILIRUB UR QL: NEGATIVE
BUN SERPL-MCNC: 8 MG/DL (ref 6–20)
BUN/CREAT SERPL: 9 (ref 12–20)
CALCIUM SERPL-MCNC: 8.3 MG/DL (ref 8.5–10.1)
CHLORIDE SERPL-SCNC: 102 MMOL/L (ref 97–108)
CO2 SERPL-SCNC: 24 MMOL/L (ref 21–32)
COLOR UR: ABNORMAL
CREAT SERPL-MCNC: 0.92 MG/DL (ref 0.55–1.02)
DIFFERENTIAL METHOD BLD: ABNORMAL
EOSINOPHIL # BLD: 0 K/UL (ref 0–0.4)
EOSINOPHIL NFR BLD: 0 % (ref 0–7)
EPITH CASTS URNS QL MICRO: ABNORMAL /LPF
ERYTHROCYTE [DISTWIDTH] IN BLOOD BY AUTOMATED COUNT: 14.5 % (ref 11.5–14.5)
GLOBULIN SER CALC-MCNC: 3.7 G/DL (ref 2–4)
GLUCOSE SERPL-MCNC: 138 MG/DL (ref 65–100)
GLUCOSE UR STRIP.AUTO-MCNC: 100 MG/DL
HCG UR QL: NEGATIVE
HCT VFR BLD AUTO: 39.8 % (ref 35–47)
HGB BLD-MCNC: 13.4 G/DL (ref 11.5–16)
HGB UR QL STRIP: ABNORMAL
IMM GRANULOCYTES # BLD AUTO: 0.1 K/UL (ref 0–0.04)
IMM GRANULOCYTES NFR BLD AUTO: 1 % (ref 0–0.5)
KETONES UR QL STRIP.AUTO: 40 MG/DL
LACTATE SERPL-SCNC: 0.8 MMOL/L (ref 0.4–2)
LEUKOCYTE ESTERASE UR QL STRIP.AUTO: NEGATIVE
LYMPHOCYTES # BLD: 0.5 K/UL (ref 0.8–3.5)
LYMPHOCYTES NFR BLD: 7 % (ref 12–49)
MCH RBC QN AUTO: 30.4 PG (ref 26–34)
MCHC RBC AUTO-ENTMCNC: 33.7 G/DL (ref 30–36.5)
MCV RBC AUTO: 90.2 FL (ref 80–99)
MONOCYTES # BLD: 0.2 K/UL (ref 0–1)
MONOCYTES NFR BLD: 3 % (ref 5–13)
NEUTS SEG # BLD: 6.3 K/UL (ref 1.8–8)
NEUTS SEG NFR BLD: 89 % (ref 32–75)
NITRITE UR QL STRIP.AUTO: NEGATIVE
NRBC # BLD: 0 K/UL (ref 0–0.01)
NRBC BLD-RTO: 0 PER 100 WBC
PH UR STRIP: 7.5 [PH] (ref 5–8)
PLATELET # BLD AUTO: 283 K/UL (ref 150–400)
PMV BLD AUTO: 10.4 FL (ref 8.9–12.9)
POTASSIUM SERPL-SCNC: 2.9 MMOL/L (ref 3.5–5.1)
PROT SERPL-MCNC: 7.5 G/DL (ref 6.4–8.2)
PROT UR STRIP-MCNC: NEGATIVE MG/DL
RBC # BLD AUTO: 4.41 M/UL (ref 3.8–5.2)
RBC #/AREA URNS HPF: ABNORMAL /HPF (ref 0–5)
RBC MORPH BLD: ABNORMAL
SODIUM SERPL-SCNC: 137 MMOL/L (ref 136–145)
SP GR UR REFRACTOMETRY: 1.01 (ref 1–1.03)
UROBILINOGEN UR QL STRIP.AUTO: 0.2 EU/DL (ref 0.2–1)
WBC # BLD AUTO: 7.1 K/UL (ref 3.6–11)
WBC URNS QL MICRO: ABNORMAL /HPF (ref 0–4)

## 2020-11-11 PROCEDURE — 74011250636 HC RX REV CODE- 250/636: Performed by: EMERGENCY MEDICINE

## 2020-11-11 PROCEDURE — 80053 COMPREHEN METABOLIC PANEL: CPT

## 2020-11-11 PROCEDURE — 81025 URINE PREGNANCY TEST: CPT

## 2020-11-11 PROCEDURE — 85025 COMPLETE CBC W/AUTO DIFF WBC: CPT

## 2020-11-11 PROCEDURE — 81001 URINALYSIS AUTO W/SCOPE: CPT

## 2020-11-11 PROCEDURE — 99285 EMERGENCY DEPT VISIT HI MDM: CPT

## 2020-11-11 PROCEDURE — 83605 ASSAY OF LACTIC ACID: CPT

## 2020-11-11 PROCEDURE — 74011250636 HC RX REV CODE- 250/636

## 2020-11-11 PROCEDURE — 96361 HYDRATE IV INFUSION ADD-ON: CPT

## 2020-11-11 PROCEDURE — 87086 URINE CULTURE/COLONY COUNT: CPT

## 2020-11-11 PROCEDURE — 96375 TX/PRO/DX INJ NEW DRUG ADDON: CPT

## 2020-11-11 PROCEDURE — 74176 CT ABD & PELVIS W/O CONTRAST: CPT

## 2020-11-11 PROCEDURE — 36415 COLL VENOUS BLD VENIPUNCTURE: CPT

## 2020-11-11 PROCEDURE — 96374 THER/PROPH/DIAG INJ IV PUSH: CPT

## 2020-11-11 RX ORDER — HYDROMORPHONE HYDROCHLORIDE 1 MG/ML
1 INJECTION, SOLUTION INTRAMUSCULAR; INTRAVENOUS; SUBCUTANEOUS ONCE
Status: COMPLETED | OUTPATIENT
Start: 2020-11-11 | End: 2020-11-11

## 2020-11-11 RX ORDER — MORPHINE SULFATE 2 MG/ML
6 INJECTION, SOLUTION INTRAMUSCULAR; INTRAVENOUS
Status: COMPLETED | OUTPATIENT
Start: 2020-11-11 | End: 2020-11-11

## 2020-11-11 RX ORDER — ONDANSETRON 2 MG/ML
INJECTION INTRAMUSCULAR; INTRAVENOUS
Status: COMPLETED
Start: 2020-11-11 | End: 2020-11-11

## 2020-11-11 RX ORDER — KETOROLAC TROMETHAMINE 30 MG/ML
30 INJECTION, SOLUTION INTRAMUSCULAR; INTRAVENOUS
Status: COMPLETED | OUTPATIENT
Start: 2020-11-11 | End: 2020-11-11

## 2020-11-11 RX ORDER — KETOROLAC TROMETHAMINE 30 MG/ML
INJECTION, SOLUTION INTRAMUSCULAR; INTRAVENOUS
Status: DISCONTINUED
Start: 2020-11-11 | End: 2020-11-11 | Stop reason: HOSPADM

## 2020-11-11 RX ORDER — HYDROMORPHONE HYDROCHLORIDE 1 MG/ML
INJECTION, SOLUTION INTRAMUSCULAR; INTRAVENOUS; SUBCUTANEOUS
Status: DISCONTINUED
Start: 2020-11-11 | End: 2020-11-11 | Stop reason: HOSPADM

## 2020-11-11 RX ORDER — OXYCODONE AND ACETAMINOPHEN 5; 325 MG/1; MG/1
1 TABLET ORAL
Qty: 15 TAB | Refills: 0 | Status: SHIPPED | OUTPATIENT
Start: 2020-11-11 | End: 2020-11-14

## 2020-11-11 RX ORDER — ONDANSETRON 2 MG/ML
4 INJECTION INTRAMUSCULAR; INTRAVENOUS
Status: COMPLETED | OUTPATIENT
Start: 2020-11-11 | End: 2020-11-11

## 2020-11-11 RX ADMIN — SODIUM CHLORIDE 1000 ML: 900 INJECTION, SOLUTION INTRAVENOUS at 02:08

## 2020-11-11 RX ADMIN — HYDROMORPHONE HYDROCHLORIDE 1 MG: 1 INJECTION, SOLUTION INTRAMUSCULAR; INTRAVENOUS; SUBCUTANEOUS at 03:10

## 2020-11-11 RX ADMIN — KETOROLAC TROMETHAMINE 30 MG: 30 INJECTION, SOLUTION INTRAMUSCULAR; INTRAVENOUS at 03:10

## 2020-11-11 RX ADMIN — MORPHINE SULFATE 6 MG: 2 INJECTION, SOLUTION INTRAMUSCULAR; INTRAVENOUS at 02:10

## 2020-11-11 RX ADMIN — ONDANSETRON 4 MG: 2 INJECTION INTRAMUSCULAR; INTRAVENOUS at 02:07

## 2020-11-11 NOTE — DISCHARGE INSTRUCTIONS
Your examination today shows that you have a 4 mm kidney stone which has not moved since her prior CAT scan 1 month ago. It most likely needs to have an intervention such as a stent placed by a urologist to help the stone pass. Please call the [place] [facility] kidney stone hotline at [phone]. They should make an appointment to have you seen within the next 24 hours. Make sure not to eat for 6 hours prior to your appointment. Take Percocet as needed for pain control.

## 2020-11-11 NOTE — ED NOTES
Patient has been instructed that they have been given Dilaudid which contains opioids, benzodiazepines, or other sedating drugs. Patient is aware that they  will need to refrain from driving or operating heavy machinery after taking this medication. Patient also instructed that they need to avoid drinking alcohol and using other products containing opioids, benzodiazepines, or other sedating drugs. Patient verbalized understanding.

## 2020-11-11 NOTE — ED NOTES
Pt arrived to ED with c/o R side pain and RLQ pain. Pt states she took 600mg Ibuprofen prior to coming in tonight but it did not help the pain. Pt reports pain is 10/10, endorses feeling hot and nauseas. Pt states she was seen here 2 weeks ago and was diagnosed with kidney stones, pt states she did not f/u with urologist. Sera Dollar smell of marijuana noted on pt. Pt is in no acute distress. Will continue to monitor. See nursing assessment. Safety precautions in place; call light within reach. Emergency Department Nursing Plan of Care       The Nursing Plan of Care is developed from the Nursing assessment and Emergency Department Attending provider initial evaluation. The plan of care may be reviewed in the ED Provider note.     The Plan of Care was developed with the following considerations:   Patient / Family readiness to learn indicated by:verbalized understanding  Persons(s) to be included in education: patient  Barriers to Learning/Limitations:No    Signed     Seth Spangler RN    11/11/2020   1:12 AM

## 2020-11-11 NOTE — ED TRIAGE NOTES
Patient reports being dx with kidney stone 2 weeks ago. Has not followed up with urology. Hx of HTN has not had meds tonight.

## 2020-11-11 NOTE — ED NOTES
Unsuccessful phlebtomy. Dr. Antoinette Duncan aware.  MD states he will do art stick once pt is back from CT

## 2020-11-11 NOTE — ED NOTES
Deepthi Ponce MD performed art stick. Small hematoma developed. MD applied pressure with gauze and band-aid.  This RN reinforced with [place]

## 2020-11-11 NOTE — ED PROVIDER NOTES
EMERGENCY DEPARTMENT HISTORY AND PHYSICAL EXAM      Date: 11/11/2020  Patient Name: Leighann Flores  Patient Age and Sex: 27 y.o. female    History of Presenting Illness     Chief Complaint   Patient presents with    Flank Pain       History Provided By: Patient    Ability to gather history was limited by:     HPI: Leighann Flores, 27 y.o. female with history of 3 or 4 mm right-sided kidney stone 1 month ago, complains of severe right flank pain. Pain is 10 out of 10 severity, much worse today. She had some lingering pain over the past few weeks for a kidney stone but became much worse again today, accompanied by nausea and some vomiting. Urinary frequency, no dysuria or hematuria. She also has subjective fevers and chills. Pain is sharp and stabbing in nature, focal in the right flank and right side. Location:    Quality:      Severity:    Duration:   Timing:      Context:    Modifying factors:   Associated symptoms:       The patient's medical, surgical, family, and social history on file were reviewed by me today. Past Medical History:   Diagnosis Date    Abnormal Pap smear     HPV    Anemia 9/30/2010    no problems in this pregnancy    Anemia 9/30/2010    Delivery normal     Elevated BP 9/30/2010    Essential hypertension     chronic    Gestational hypertension 9/16/2010    Hypertension      History reviewed. No pertinent surgical history. PCP: None    Past History     Past Medical History:  Past Medical History:   Diagnosis Date    Abnormal Pap smear     HPV    Anemia 9/30/2010    no problems in this pregnancy    Anemia 9/30/2010    Delivery normal     Elevated BP 9/30/2010    Essential hypertension     chronic    Gestational hypertension 9/16/2010    Hypertension        Past Surgical History:  History reviewed. No pertinent surgical history. Family History:  History reviewed. No pertinent family history.     Social History:  Social History     Tobacco Use    Smoking status: Never Smoker    Smokeless tobacco: Never Used   Substance Use Topics    Alcohol use: Yes     Comment: occasionaly     Drug use: Yes     Types: Marijuana     Comment: daily       Allergies:  No Known Allergies    Current Medications:  No current facility-administered medications on file prior to encounter. Current Outpatient Medications on File Prior to Encounter   Medication Sig Dispense Refill    amLODIPine (Norvasc) 5 mg tablet Take 1 Tab by mouth daily. 14 Tab 0    ibuprofen (MOTRIN) 600 mg tablet Take 1 Tab by mouth every six (6) hours as needed for Pain. 20 Tab 0    medroxyPROGESTERone (DEPO-PROVERA) 150 mg/mL injection 150 mg by IntraMUSCular route once.  ondansetron (ZOFRAN ODT) 4 mg disintegrating tablet Take 1 Tab by mouth every eight (8) hours as needed for Nausea. 12 Tab 0    lidocaine (LIDODERM) 5 % Apply patch to the affected area for 12 hours a day and remove for 12 hours a day. 5 Each 0    lisinopril-hydrochlorothiazide (PRINZIDE, ZESTORETIC) 10-12.5 mg per tablet Take 1 Tab by mouth daily. 20 Tab 0       Review of Systems   Review of Systems   Constitutional: Positive for chills, diaphoresis and fever. Gastrointestinal: Positive for nausea and vomiting. Negative for abdominal pain. Genitourinary: Positive for flank pain and frequency. All other systems reviewed and are negative. Physical Exam   Vital Signs  Patient Vitals for the past 8 hrs:   Temp Pulse Resp BP SpO2   11/11/20 0400    (!) 152/98 99 %   11/11/20 0345    (!) 159/114 99 %   11/11/20 0305     100 %   11/11/20 0304    (!) 161/125 100 %   11/11/20 0246     100 %   11/11/20 0217    (!) 189/118 100 %   11/11/20 0130     100 %   11/11/20 0104     100 %   11/11/20 0100 98.2 °F (36.8 °C) 97 18 (!) 164/124 100 %          Physical Exam  Vitals signs and nursing note reviewed. Constitutional:       General: She is in acute distress ( Appears uncomfortable).       Appearance: Normal appearance. She is well-developed. She is not ill-appearing. HENT:      Head: Normocephalic and atraumatic. Mouth/Throat:      Mouth: Mucous membranes are moist.   Eyes:      General:         Right eye: No discharge. Left eye: No discharge. Conjunctiva/sclera: Conjunctivae normal.   Neck:      Musculoskeletal: Normal range of motion and neck supple. Cardiovascular:      Rate and Rhythm: Normal rate and regular rhythm. Heart sounds: Normal heart sounds. No murmur. Pulmonary:      Effort: Pulmonary effort is normal. No respiratory distress. Breath sounds: Normal breath sounds. No wheezing. Abdominal:      General: There is no distension. Palpations: Abdomen is soft. Tenderness: There is no abdominal tenderness. There is right CVA tenderness. Musculoskeletal: Normal range of motion. General: No deformity. Skin:     General: Skin is warm and dry. Findings: No rash. Neurological:      General: No focal deficit present. Mental Status: She is alert and oriented to person, place, and time.    Psychiatric:         Speech: Speech normal.         Behavior: Behavior normal.         Cognition and Memory: Cognition normal.         Diagnostic Study Results   Labs  Recent Results (from the past 24 hour(s))   HCG URINE, QL. - POC    Collection Time: 11/11/20  1:49 AM   Result Value Ref Range    Pregnancy test,urine (POC) Negative NEG     CBC WITH AUTOMATED DIFF    Collection Time: 11/11/20  3:38 AM   Result Value Ref Range    WBC 7.1 3.6 - 11.0 K/uL    RBC 4.41 3.80 - 5.20 M/uL    HGB 13.4 11.5 - 16.0 g/dL    HCT 39.8 35.0 - 47.0 %    MCV 90.2 80.0 - 99.0 FL    MCH 30.4 26.0 - 34.0 PG    MCHC 33.7 30.0 - 36.5 g/dL    RDW 14.5 11.5 - 14.5 %    PLATELET 935 771 - 172 K/uL    MPV 10.4 8.9 - 12.9 FL    NRBC 0.0 0  WBC    ABSOLUTE NRBC 0.00 0.00 - 0.01 K/uL    NEUTROPHILS 89 (H) 32 - 75 %    LYMPHOCYTES 7 (L) 12 - 49 %    MONOCYTES 3 (L) 5 - 13 % EOSINOPHILS 0 0 - 7 %    BASOPHILS 0 0 - 1 %    IMMATURE GRANULOCYTES 1 (H) 0.0 - 0.5 %    ABS. NEUTROPHILS 6.3 1.8 - 8.0 K/UL    ABS. LYMPHOCYTES 0.5 (L) 0.8 - 3.5 K/UL    ABS. MONOCYTES 0.2 0.0 - 1.0 K/UL    ABS. EOSINOPHILS 0.0 0.0 - 0.4 K/UL    ABS. BASOPHILS 0.0 0.0 - 0.1 K/UL    ABS. IMM. GRANS. 0.1 (H) 0.00 - 0.04 K/UL    DF SMEAR SCANNED      RBC COMMENTS NORMOCYTIC, NORMOCHROMIC     METABOLIC PANEL, COMPREHENSIVE    Collection Time: 11/11/20  3:38 AM   Result Value Ref Range    Sodium 137 136 - 145 mmol/L    Potassium 2.9 (L) 3.5 - 5.1 mmol/L    Chloride 102 97 - 108 mmol/L    CO2 24 21 - 32 mmol/L    Anion gap 11 5 - 15 mmol/L    Glucose 138 (H) 65 - 100 mg/dL    BUN 8 6 - 20 MG/DL    Creatinine 0.92 0.55 - 1.02 MG/DL    BUN/Creatinine ratio 9 (L) 12 - 20      GFR est AA >60 >60 ml/min/1.73m2    GFR est non-AA >60 >60 ml/min/1.73m2    Calcium 8.3 (L) 8.5 - 10.1 MG/DL    Bilirubin, total 0.4 0.2 - 1.0 MG/DL    ALT (SGPT) 19 12 - 78 U/L    AST (SGOT) 16 15 - 37 U/L    Alk.  phosphatase 181 (H) 45 - 117 U/L    Protein, total 7.5 6.4 - 8.2 g/dL    Albumin 3.8 3.5 - 5.0 g/dL    Globulin 3.7 2.0 - 4.0 g/dL    A-G Ratio 1.0 (L) 1.1 - 2.2     LACTIC ACID    Collection Time: 11/11/20  3:38 AM   Result Value Ref Range    Lactic acid 0.8 0.4 - 2.0 MMOL/L   URINALYSIS W/ RFLX MICROSCOPIC    Collection Time: 11/11/20  3:39 AM   Result Value Ref Range    Color YELLOW/STRAW      Appearance CLOUDY (A) CLEAR      Specific gravity 1.010 1.003 - 1.030      pH (UA) 7.5 5.0 - 8.0      Protein Negative NEG mg/dL    Glucose 100 (A) NEG mg/dL    Ketone 40 (A) NEG mg/dL    Bilirubin Negative NEG      Blood MODERATE (A) NEG      Urobilinogen 0.2 0.2 - 1.0 EU/dL    Nitrites Negative NEG      Leukocyte Esterase Negative NEG     URINE MICROSCOPIC ONLY    Collection Time: 11/11/20  3:39 AM   Result Value Ref Range    WBC 0-4 0 - 4 /hpf    RBC 20-50 0 - 5 /hpf    Epithelial cells FEW FEW /lpf    Bacteria Negative NEG /hpf    Amorphous Crystals 2+ (A) NEG       Radiologic Studies  CT ABD PELV WO CONT   Final Result   IMPRESSION:      1. Increased right-sided hydronephrosis with 4 mm calculus in the proximal right   ureter. Increased edema in the right renal parenchyma suggestive of possible   developing pyelonephritis. IV contrast was not administered. 2. Bilateral nephrolithiasis        CT Results  (Last 48 hours)               11/11/20 0251  CT ABD PELV WO CONT Final result    Impression:  IMPRESSION:       1. Increased right-sided hydronephrosis with 4 mm calculus in the proximal right   ureter. Increased edema in the right renal parenchyma suggestive of possible   developing pyelonephritis. IV contrast was not administered. 2. Bilateral nephrolithiasis       Narrative:  CLINICAL HISTORY: severe right flank pain, fever/chills. Kidney stone a few   weeks ago. Likely stone +/- pyelo    INDICATION: severe right flank pain, fever/chills. Kidney stone a few weeks   ago. Likely stone +/- pyelo   COMPARISON: 10/13/2020   CONTRAST:  None. TECHNIQUE:    Thin axial images were obtained through the abdomen and pelvis. Coronal and   sagittal reformats were generated. Oral contrast was not administered. CT dose   reduction was achieved through use of a standardized protocol tailored for this   examination and automatic exposure control for dose modulation. The absence of intravenous contrast material reduces the sensitivity for   evaluation of visceral organs and vasculature including presence of small mass   lesions, hemodynamically significant stenoses, dissections, mucosal   abnormalities etc.       FINDINGS:    LOWER THORAX: Mild cardiomegaly. LIVER: No mass. BILIARY TREE: Gallbladder is within normal limits. CBD is not dilated. SPLEEN: within normal limits. PANCREAS: No focal abnormality. ADRENALS: Unremarkable. KIDNEYS/URETERS: Bilateral nephrolithiasis. Right hydronephrosis increased.    Increased boggy appearance of the right kidney. With 4 mm calculus proximal   right ureter. STOMACH: Unremarkable. SMALL BOWEL: No dilatation or wall thickening. COLON: No dilatation or wall thickening. APPENDIX: Normal.   PERITONEUM: No ascites or pneumoperitoneum. RETROPERITONEUM: No lymphadenopathy or aortic aneurysm. REPRODUCTIVE ORGANS: Normal.   URINARY BLADDER: No mass or calculus. BONES: No destructive bone lesion. ABDOMINAL WALL: No mass or hernia. ADDITIONAL COMMENTS: N/A               CXR Results  (Last 48 hours)    None          Procedures   Procedures    Medical Decision Making     I reviewed the patient's most recent Emergency Dept notes and diagnostic tests  in formulating my MDM on today's visit. Provider Notes (Medical Decision Making):   57-year-old female with recent kidney stone a month ago complaining of severe right flank pain with fevers and chills and nausea and vomiting, much worse today. On exam she appears in significant pain, with significant tenderness in the right flank and the right side. We will obtain laboratories, urinalysis, repeat CT abdomen pelvis. Administer morphine and fluids. Most likely symptomatic kidney stone, possible pyelonephritis. Calvin Villa MD  1:30 AM    No evidence of pyelonephritis. Patient has a persistent 4 mm proximal ureteral stone, which is not significantly moved since her prior CT scan a month ago. Also has some mildly worsening hydronephrosis. I consulted with Dr. Patricio Pierre of [facility]. He recommends that patient call the kidney stone hotline and be seen in their office today. She is stable for discharge home with Percocet prescription, will call in the morning to make an appointment.     Consults: Urology    Social History     Tobacco Use    Smoking status: Never Smoker    Smokeless tobacco: Never Used   Substance Use Topics    Alcohol use: Yes     Comment: occasionaly     Drug use: Yes     Types: Marijuana     Comment: daily Patient Vitals for the past 4 hrs:   BP SpO2   11/11/20 0400 (!) 152/98 99 %   11/11/20 0345 (!) 159/114 99 %   11/11/20 0305  100 %   11/11/20 0304 (!) 161/125 100 %   11/11/20 0246  100 %   11/11/20 0217 (!) 189/118 100 %   11/11/20 0130  100 %          Prescriptions from today's ED visit:  Current Discharge Medication List      START taking these medications    Details   oxyCODONE-acetaminophen (Percocet) 5-325 mg per tablet Take 1 Tab by mouth every six (6) hours as needed for Pain for up to 3 days. Max Daily Amount: 4 Tabs. Qty: 15 Tab, Refills: 0    Associated Diagnoses: Kidney stone on right side              Medications Administered during ED course:  Medications   morphine injection 6 mg (6 mg IntraVENous Given 11/11/20 0210)   sodium chloride 0.9 % bolus infusion 1,000 mL (1,000 mL IntraVENous New Bag 11/11/20 0208)   ondansetron (ZOFRAN) injection 4 mg (4 mg IntraVENous Given 11/11/20 0207)   ketorolac (TORADOL) injection 30 mg (30 mg IntraVENous Given 11/11/20 0310)   HYDROmorphone (DILAUDID) injection 1 mg (1 mg IntraVENous Given 11/11/20 0310)          Diagnosis and Disposition     Disposition:  Discharged    Clinical Impression:   1. Kidney stone on right side        Attestation:  I personally performed the services described in this documentation on this date 11/11/2020 for patient Leighann Flores. June Stephens MD        I was the first provider for this patient on this visit. To the best of my ability I reviewed relevant prior medical records, electrocardiograms, laboratories, and radiologic studies. The patient's presenting problems were discussed, and the patient was in agreement with the care plan formulated and outlined with them. June Stephens MD    Please note that this dictation was completed with Dragon voice recognition software.  Quite often unanticipated grammatical, syntax, homophones, and other interpretive errors are inadvertently transcribed by the computer software. Please disregard these errors and excuse any errors that have escaped final proofreading.

## 2020-11-12 LAB
BACTERIA SPEC CULT: NORMAL
SERVICE CMNT-IMP: NORMAL

## 2020-11-30 ENCOUNTER — VIRTUAL VISIT (OUTPATIENT)
Dept: ENDOCRINOLOGY | Age: 30
End: 2020-11-30
Payer: COMMERCIAL

## 2020-11-30 DIAGNOSIS — E04.9 GOITER: ICD-10-CM

## 2020-11-30 DIAGNOSIS — N20.0 RENAL STONES: ICD-10-CM

## 2020-11-30 DIAGNOSIS — E05.90 HYPERTHYROIDISM: Primary | ICD-10-CM

## 2020-11-30 PROCEDURE — 99205 OFFICE O/P NEW HI 60 MIN: CPT | Performed by: INTERNAL MEDICINE

## 2020-11-30 RX ORDER — LOSARTAN POTASSIUM 50 MG/1
100 TABLET ORAL 2 TIMES DAILY
COMMUNITY
Start: 2020-11-13

## 2020-11-30 NOTE — PATIENT INSTRUCTIONS
Hyperthyroidism: Care Instructions Your Care Instructions Hyperthyroidism occurs when the thyroid gland makes too much thyroid hormone. This speeds up your metabolismhow your body uses energy. This condition can cause you to be very active, lose weight, and have sleep problems, eye problems, and a fast heart rate. It can also cause a goiter. A goiter is an enlarged thyroid gland that you can see at the front of the neck. Hyperthyroidism is often caused by Graves' disease. In Graves' disease, the body's defense (immune) system attacks the thyroid gland. Your doctor may prescribe a beta-blocker medicine to slow your pulse and calm you down. But this is not a treatment for hyperthyroidism. It is given for your fast heart rate. Your doctor may also give you antithyroid medicine. This medicine keeps excess thyroid hormone in check. In some cases, doctors recommend radioactive iodine or surgery to remove the thyroid. After either of these treatments, you may need to take medicine to replace thyroid hormone for the rest of your life. Follow-up care is a key part of your treatment and safety. Be sure to make and go to all appointments, and call your doctor if you are having problems. It's also a good idea to know your test results and keep a list of the medicines you take. How can you care for yourself at home? · Take your medicines exactly as prescribed. You need to take the thyroid medicine at the same time each day. Call your doctor if you think you are having a problem with your medicine. · Graves' disease can make your eyes sore. Use artificial tears, eye drops, and sunglasses to protect your eyes from dryness, wind, and sun. Raise your head with pillows at night to prevent your eyes from swelling. In some cases, taping your eyelids shut at night will keep your eyes from being dry in the morning. · Make sure you get enough calcium.  Foods that are rich in calcium include milk, yogurt, cheese, and dark green vegetables. · If you need to gain weight, ask your doctor about special diets. · Do not eat kelp. Winsome Beebe is high in iodine, which can make hyperthyroidism worse. Winsome Beebe is commonly used in [employer] and other [place] foods. You can use iodized salt and eat bread and seafood. Try to eat a balanced diet. · Do not use caffeine and other stimulants. These can make symptoms worse, such as a fast heartbeat, nervousness, and problems focusing. · Do not smoke. Smoking can make your condition worse and may lead to more serious eye problems. If you need help quitting, talk to your doctor about stop-smoking programs and medicines. These can increase your chances of quitting for good. · Lower your stress. Learn to use biofeedback, guided imagery, meditation, or other methods to relax. · Use creams or ointments for irritated skin. Ask your doctor which type to use. · Tell all your doctors about your condition. They need to know because some medicines contain iodine. When should you call for help? Call your doctor now or seek immediate medical care if: 
  · You have symptoms of a sudden, very high thyroid level (thyroid storm). These include: 
? Being nauseated, vomiting, and having diarrhea. ? Sweating a lot. ? Feeling extremely restless and confused. ? Having a high fever. ? Having a fast heartbeat.  
  · You have sudden vision changes or eye pain.  
  · You have a fever or severe sore throat and are taking antithyroid medicines, such as PTU or methimazole. Watch closely for changes in your health, and be sure to contact your doctor if: 
  · You have a sore throat or have problems swallowing.  
  · You have swollen, itchy, or red eyes or your other eye symptoms get worse, or you have new vision problems.  
  · You have signs of a low thyroid level (hypothyroidism). You may feel very tired, confused, or weak. Where can you learn more? Go to http://www.gray.com/ Enter U930 in the search box to learn more about \"Hyperthyroidism: Care Instructions. \" Current as of: March 31, 2020               Content Version: 12.6 © 6072-0420 [employer]. Care instructions adapted under license by [employer] (which disclaims liability or warranty for this information). If you have questions about a medical condition or this instruction, always ask your healthcare professional. [address] any warranty or liability for your use of this information. Methimazole (By mouth) Methimazole (meth-IM-a-zole) Treats hyperthyroidism (too much thyroid hormone produced by the thyroid gland). Brand Name(s): Tapazole There may be other brand names for this medicine. When This Medicine Should Not Be Used: You should not use this medicine if you have had an allergic reaction to methimazole or carbimazole, or if you are pregnant or breastfeeding. How to Use This Medicine:  
Tablet · Your doctor will tell you how much of this medicine to take and how often. Do not take more medicine or take it more often than your doctor tells you to. · You may take this medicine with or without food. · It is best to take this medicine at the same time every day. If a dose is missed: · If you miss a dose or forget to take your medicine, take it as soon as you can. If it is almost time for your next dose, wait until then to take the medicine and skip the missed dose. · Do not use extra medicine to make up for a missed dose. How to Store and Dispose of This Medicine: · Store the medicine at room temperature, away from heat, moisture, and direct light. · Keep all medicine out of the reach of children and never share your medicine with anyone. Drugs and Foods to Avoid: Ask your doctor or pharmacist before using any other medicine, including over-the-counter medicines, vitamins, and herbal products. · Make sure your doctor knows if you are using a blood thinner (Coumadin®), blood pressure medicine (such as atenolol, metoprolol, propranolol, Corgard®, Inderal®, Lopressor®, Toprol®, Tenormin®), digoxin (Lanoxin®), or theophylline (Pardeep-Dur®). Warnings While Using This Medicine: · Using this medicine while you are pregnant can harm your unborn baby. Use an effective form of birth control to keep from getting pregnant. If you think you have become pregnant while using the medicine, tell your doctor right away. · Make sure any doctor or dentist who treats you knows that you are using this medicine. Possible Side Effects While Using This Medicine:  
Call your doctor right away if you notice any of these side effects: · Dark-colored urine, pale stools · Fever, chills, cough, sore throat · Nausea, vomiting, loss of appetite, pain in the upper stomach · Numbness, tingling, or burning in your hands or feet · Severe skin rash or itching · Unusual bruising or bleeding · Unusual weakness or tiredness · Yellow skin or eyes If you notice these less serious side effects, talk with your doctor: · Dizziness · Joint pain · Mild nausea and vomiting, stomach upset · Mild skin rash If you notice other side effects that you think are caused by this medicine, tell your doctor. Call your doctor for medical advice about side effects. You may report side effects to FDA at 5-084-FDA-7037 © 2017 [facility] Information is for End User's use only and may not be sold, redistributed or otherwise used for commercial purposes. The above information is an  only. It is not intended as medical advice for individual conditions or treatments. Talk to your doctor, nurse or pharmacist before following any medical regimen to see if it is safe and effective for you.

## 2020-11-30 NOTE — PROGRESS NOTES
Chief Complaint   Patient presents with    Thyroid Problem            **THIS IS A VIRTUAL VISIT VIA A VIDEO ENCOUNTER. PATIENT AGREED TO HAVE THEIR CARE DELIVERED OVER VIDEO IN PLACE OF THEIR REGULARLY SCHEDULED OFFICE VISIT**      History of Present Illness: Charles Flores is a 27 y.o. female who I was asked to see in consult by Dr. Marialuisa Negron for evaluation of hyperthyroidism. Will request records from Dr. Jocelyn Beach. \"I went to my PCP and she was concerned about my thyroid labs, I was going to see her to establish as a new patient. I had high BP and she ran a bunch of tests and she called and told me my thyroid was off. \"    Pt notes she was first told he has had HTN since 2010 when she was pregnant with her first son. For her HTN she is taking amlodipine and losartan. On these medications, her BPs have been improving. She is checking her BPs daily at home. She notes her BPs have been 130/90s. Pt notes that \"when I was younger I was told something about my thyroid, but I did not pay it too much mind\". She denies issues of dysphagia, dysphonia or chocking issues. She denies issues of CP, SOB, palpitations, tremors, \"I do feel cold all the time\", she denies issues of diarrhea or constipation. Pt notes she has been on \"a bunch of different things because I had a uterine stint put in and they were giving me a bunch of pills. \" This was placed on 11/15/20 and removed on 11/18/20. She is following with Dr. Ricki Alex [facility]). She has not had any renal stone work-up at this time. No known family hx of thyroid issues. No known family hx of renal stones. Pt was born in [place], she has never lived outside the [address] [place] [address],[address], no know exposures to ionizing radiation. In addition to the hyperthyroidism, I see in her records that she has been in the ED 4 times for pelvic/flank pain since January 2019 and CTs showed evidence of renal stones.   The labs during these visits did not show evidence of hypercalcemia. Looking back she has also had a thyroid US in November 2015, which showed sub-cm thyroid cysts (see results below). Past Medical History:   Diagnosis Date    Abnormal Pap smear     HPV    Anemia 9/30/2010    no problems in this pregnancy    Anemia 9/30/2010    Delivery normal     Elevated BP 9/30/2010    Essential hypertension     chronic    Gestational hypertension 9/16/2010    Hypertension      Past Surgical History:   Procedure Laterality Date    HX LITHOTRIPSY  11/2020    HX OTHER ARTIFICIAL OPENING  11/2020    urethral stent place and removed 3 days     Current Outpatient Medications   Medication Sig    losartan (COZAAR) 50 mg tablet 100 mg two (2) times a day.  amLODIPine (Norvasc) 5 mg tablet Take 1 Tab by mouth daily. (Patient taking differently: Take 10 mg by mouth daily.)    medroxyPROGESTERone (DEPO-PROVERA) 150 mg/mL injection 150 mg by IntraMUSCular route every three (3) months. No current facility-administered medications for this visit.       No Known Allergies  Family History   Problem Relation Age of Onset    Breast Cancer Mother     Sleep Apnea Mother     Diabetes Mother     Cancer Mother         Breast    Hypertension Father     No Known Problems Sister     Stroke Maternal Grandmother     Heart Attack Maternal Grandfather     Liver Disease Paternal Grandmother     Lung Cancer Paternal Grandfather     Cancer Paternal Grandfather         Lung     Social History     Socioeconomic History    Marital status: SINGLE     Spouse name: Not on file    Number of children: Not on file    Years of education: Not on file    Highest education level: Not on file   Occupational History    Not on file   Social Needs    Financial resource strain: Not on file    Food insecurity     Worry: Not on file     Inability: Not on file    Transportation needs     Medical: Not on file     Non-medical: Not on file   Tobacco Use    Smoking status: Never Smoker    Smokeless tobacco: Never Used   Substance and Sexual Activity    Alcohol use: Yes     Comment: occasionaly     Drug use: Yes     Types: Marijuana     Comment: daily    Sexual activity: Yes     Partners: Male     Birth control/protection: Injection   Lifestyle    Physical activity     Days per week: Not on file     Minutes per session: Not on file    Stress: Not on file   Relationships    Social connections     Talks on phone: Not on file     Gets together: Not on file     Attends Buddhist service: Not on file     Active member of club or organization: Not on file     Attends meetings of clubs or organizations: Not on file     Relationship status: Not on file    Intimate partner violence     Fear of current or ex partner: Not on file     Emotionally abused: Not on file     Physically abused: Not on file     Forced sexual activity: Not on file   Other Topics Concern    Not on file   Social History Narrative    ** Merged History Encounter **          Review of Systems:  - Constitutional Symptoms: no fevers, chills, weight loss  - Eyes: no blurry vision or double vision  - Cardiovascular: no chest pain or palpitations  - Respiratory: no cough or shortness of breath  - Gastrointestinal: no dysphagia or abdominal pain  - Musculoskeletal: no joint pains or weakness  - Integumentary: no rashes  - Neurological: no numbness, tingling, or headaches  - Psychiatric: no depression or anxiety  - Endocrine: no heat or cold intolerance, no polyuria or polydipsia    Physical Examination:  There were no vitals taken for this visit.   - GENERAL: NCAT, Appears well nourished   - EYES: EOMI, non-icteric, no proptosis   - Ear/Nose/Throat: NCAT, no visible inflammation or masses   - CARDIOVASCULAR: no cyanosis, no visible JVD   - RESPIRATORY: respiratory effort normal without any distress or labored breathing   - MUSCULOSKELETAL: Normal ROM of neck and upper extremities observed   - SKIN: No rash on face   - NEUROLOGIC:  No facial asymmetry (Cranial nerve 7 motor function), No gaze palsy   - PSYCHIATRIC: Normal affect, Normal insight and judgement   -     Data Reviewed:   Component      Latest Ref Rng & Units 11/11/2020 10/13/2020           3:38 AM  2:09 PM   Sodium      136 - 145 mmol/L 137 138   Potassium      3.5 - 5.1 mmol/L 2.9 (L) 4.0   Chloride      97 - 108 mmol/L 102 104   CO2      21 - 32 mmol/L 24 27   Anion gap      5 - 15 mmol/L 11 7   Glucose      65 - 100 mg/dL 138 (H) 99   BUN      6 - 20 MG/DL 8 10   Creatinine      0.55 - 1.02 MG/DL 0.92 0.89   BUN/Creatinine ratio      12 - 20   9 (L) 11 (L)   GFR est AA      >60 ml/min/1.73m2 >60 >60   GFR est non-AA      >60 ml/min/1.73m2 >60 >60   Calcium      8.5 - 10.1 MG/DL 8.3 (L) 8.1 (L)   Bilirubin, total      0.2 - 1.0 MG/DL 0.4 0.5   ALT      12 - 78 U/L 19 18   AST      15 - 37 U/L 16 28   Alk. phosphatase      45 - 117 U/L 181 (H) 165 (H)   Protein, total      6.4 - 8.2 g/dL 7.5 7.4   Albumin      3.5 - 5.0 g/dL 3.8 3.6   Globulin      2.0 - 4.0 g/dL 3.7 3.8   A-G Ratio      1.1 - 2.2   1.0 (L) 0.9 (L)     CLINICAL HISTORY: severe right flank pain, fever/chills. Kidney stone a few  weeks ago. Likely stone +/- pyelo   INDICATION: severe right flank pain, fever/chills. Kidney stone a few weeks  ago. Likely stone +/- pyelo  COMPARISON: 10/13/2020  CONTRAST:  None.     TECHNIQUE:   Thin axial images were obtained through the abdomen and pelvis. Coronal and  sagittal reformats were generated. Oral contrast was not administered. CT dose  reduction was achieved through use of a standardized protocol tailored for this  examination and automatic exposure control for dose modulation.      The absence of intravenous contrast material reduces the sensitivity for  evaluation of visceral organs and vasculature including presence of small mass  lesions, hemodynamically significant stenoses, dissections, mucosal  abnormalities etc.     FINDINGS:   LOWER THORAX: Mild cardiomegaly.   LIVER: No mass.  BILIARY TREE: Gallbladder is within normal limits. CBD is not dilated. SPLEEN: within normal limits. PANCREAS: No focal abnormality. ADRENALS: Unremarkable. KIDNEYS/URETERS: Bilateral nephrolithiasis. Right hydronephrosis increased. Increased boggy appearance of the right kidney. With 4 mm calculus proximal  right ureter. STOMACH: Unremarkable. SMALL BOWEL: No dilatation or wall thickening. COLON: No dilatation or wall thickening. APPENDIX: Normal.  PERITONEUM: No ascites or pneumoperitoneum. RETROPERITONEUM: No lymphadenopathy or aortic aneurysm. REPRODUCTIVE ORGANS: Normal.  URINARY BLADDER: No mass or calculus. BONES: No destructive bone lesion. ABDOMINAL WALL: No mass or hernia. ADDITIONAL COMMENTS: N/A     IMPRESSION  IMPRESSION:     1. Increased right-sided hydronephrosis with 4 mm calculus in the proximal right  ureter. Increased edema in the right renal parenchyma suggestive of possible  developing pyelonephritis. IV contrast was not administered. 2. Bilateral nephrolithiasis    ICD Codes / Adm. Diagnosis: 241.9  E04.9 / Unspecified nontoxic nodular g    Nontoxic goiter, unspecified  Examination:  US THYROID PARATHYROID  - [id number] - Nov 5 2015  9:16AM  Accession No:  [id number]  Reason:  NON TOXIC GOITER        REPORT:  EXAM:  US THYROID PARATHYROID     INDICATION:   NON TOXIC GOITER     COMPARISON: None.     FINDINGS: Real-time sonography of the thyroid gland was performed with a   high frequency linear transducer. Multiple static images were obtained.     The background texture of the thyroid gland is normal. There is a 3.2 x 1 mm   cystic nodule in the anterior aspect of the upper right lobe and there is a   1 x 2 x 3 mm nodule in the mid left lobe. No solid thyroid nodules are   demonstrated.     The right lobe measures 4.5 x 1.4 x 1.5 cm and the left lobe measures 4.7 x   1.3 x 1.9 cm. The isthmus measures 2 mm.         IMPRESSION:  Normal thyroid gland size and echotexture.  Tiny bilateral   thyroid cysts, of doubtful significance. Assessment/Plan:   1. Hyperthyroidism    2. Renal stones    3. Goiter    1) Pt reports her PCP found abnormal TFTs when being tested for her HTN. She is not having signs of palpitations, tremors, heat intolerance, weight loss, CP, SOB. We discussed at length the Pituitary-Thyroid Axis, and the function of TSH on the thyroid gland and how it is used to help assess thyroid function in the body. Will start by testing TSH, FT4, TT3, TRAb, TSI, Thyroid Ab, thyroid US and Thyroid uptake and scan. We discussed treatment options including Methimazole and thyroidectomy. 2) Will order a renal panel and PTH to look for evidence of hyperparathyroidism as a cause for her renal stones. 3) See #1. Pt voices understanding and agreement with the plan. RTC based on the above labs. Patient Instructions          Hyperthyroidism: Care Instructions  Your Care Instructions  Hyperthyroidism occurs when the thyroid gland makes too much thyroid hormone. This speeds up your metabolismhow your body uses energy. This condition can cause you to be very active, lose weight, and have sleep problems, eye problems, and a fast heart rate. It can also cause a goiter. A goiter is an enlarged thyroid gland that you can see at the front of the neck. Hyperthyroidism is often caused by Graves' disease. In Graves' disease, the body's defense (immune) system attacks the thyroid gland. Your doctor may prescribe a beta-blocker medicine to slow your pulse and calm you down. But this is not a treatment for hyperthyroidism. It is given for your fast heart rate. Your doctor may also give you antithyroid medicine. This medicine keeps excess thyroid hormone in check. In some cases, doctors recommend radioactive iodine or surgery to remove the thyroid. After either of these treatments, you may need to take medicine to replace thyroid hormone for the rest of your life.   Follow-up care is a key part of your treatment and safety. Be sure to make and go to all appointments, and call your doctor if you are having problems. It's also a good idea to know your test results and keep a list of the medicines you take. How can you care for yourself at home? · Take your medicines exactly as prescribed. You need to take the thyroid medicine at the same time each day. Call your doctor if you think you are having a problem with your medicine. · Graves' disease can make your eyes sore. Use artificial tears, eye drops, and sunglasses to protect your eyes from dryness, wind, and sun. Raise your head with pillows at night to prevent your eyes from swelling. In some cases, taping your eyelids shut at night will keep your eyes from being dry in the morning. · Make sure you get enough calcium. Foods that are rich in calcium include milk, yogurt, cheese, and dark green vegetables. · If you need to gain weight, ask your doctor about special diets. · Do not eat kelp. Thor High is high in iodine, which can make hyperthyroidism worse. Thor High is commonly used in [employer] and other [place] foods. You can use iodized salt and eat bread and seafood. Try to eat a balanced diet. · Do not use caffeine and other stimulants. These can make symptoms worse, such as a fast heartbeat, nervousness, and problems focusing. · Do not smoke. Smoking can make your condition worse and may lead to more serious eye problems. If you need help quitting, talk to your doctor about stop-smoking programs and medicines. These can increase your chances of quitting for good. · Lower your stress. Learn to use biofeedback, guided imagery, meditation, or other methods to relax. · Use creams or ointments for irritated skin. Ask your doctor which type to use. · Tell all your doctors about your condition. They need to know because some medicines contain iodine. When should you call for help?    Call your doctor now or seek immediate medical care if:    · You have symptoms of a sudden, very high thyroid level (thyroid storm). These include:  ? Being nauseated, vomiting, and having diarrhea. ? Sweating a lot. ? Feeling extremely restless and confused. ? Having a high fever. ? Having a fast heartbeat.     · You have sudden vision changes or eye pain.     · You have a fever or severe sore throat and are taking antithyroid medicines, such as PTU or methimazole. Watch closely for changes in your health, and be sure to contact your doctor if:    · You have a sore throat or have problems swallowing.     · You have swollen, itchy, or red eyes or your other eye symptoms get worse, or you have new vision problems.     · You have signs of a low thyroid level (hypothyroidism). You may feel very tired, confused, or weak. Where can you learn more? Go to http://www.gray.com/  Enter L505 in the search box to learn more about \"Hyperthyroidism: Care Instructions. \"  Current as of: March 31, 2020               Content Version: 12.6  © 1187-9039 [employer]. Care instructions adapted under license by [employer] (which disclaims liability or warranty for this information). If you have questions about a medical condition or this instruction, always ask your healthcare professional. [address] any warranty or liability for your use of this information. Methimazole (By mouth)   Methimazole (meth-IM-a-zole)  Treats hyperthyroidism (too much thyroid hormone produced by the thyroid gland). Brand Name(s): Tapazole   There may be other brand names for this medicine. When This Medicine Should Not Be Used: You should not use this medicine if you have had an allergic reaction to methimazole or carbimazole, or if you are pregnant or breastfeeding. How to Use This Medicine:   Tablet  · Your doctor will tell you how much of this medicine to take and how often.  Do not take more medicine or take it more often than your doctor tells you to. · You may take this medicine with or without food. · It is best to take this medicine at the same time every day. If a dose is missed:   · If you miss a dose or forget to take your medicine, take it as soon as you can. If it is almost time for your next dose, wait until then to take the medicine and skip the missed dose. · Do not use extra medicine to make up for a missed dose. How to Store and Dispose of This Medicine:   · Store the medicine at room temperature, away from heat, moisture, and direct light. · Keep all medicine out of the reach of children and never share your medicine with anyone. Drugs and Foods to Avoid:   Ask your doctor or pharmacist before using any other medicine, including over-the-counter medicines, vitamins, and herbal products. · Make sure your doctor knows if you are using a blood thinner (Coumadin®), blood pressure medicine (such as atenolol, metoprolol, propranolol, Corgard®, Inderal®, Lopressor®, Toprol®, Tenormin®), digoxin (Lanoxin®), or theophylline (Pardeep-Dur®). Warnings While Using This Medicine:   · Using this medicine while you are pregnant can harm your unborn baby. Use an effective form of birth control to keep from getting pregnant. If you think you have become pregnant while using the medicine, tell your doctor right away. · Make sure any doctor or dentist who treats you knows that you are using this medicine.   Possible Side Effects While Using This Medicine:   Call your doctor right away if you notice any of these side effects:  · Dark-colored urine, pale stools  · Fever, chills, cough, sore throat  · Nausea, vomiting, loss of appetite, pain in the upper stomach  · Numbness, tingling, or burning in your hands or feet  · Severe skin rash or itching  · Unusual bruising or bleeding  · Unusual weakness or tiredness  · Yellow skin or eyes  If you notice these less serious side effects, talk with your doctor:   · Dizziness  · Joint pain  · Mild nausea and vomiting, stomach upset  · Mild skin rash  If you notice other side effects that you think are caused by this medicine, tell your doctor. Call your doctor for medical advice about side effects. You may report side effects to FDA at 0-899-IVR-4999  © 2017 [facility] Information is for End User's use only and may not be sold, redistributed or otherwise used for commercial purposes. The above information is an  only. It is not intended as medical advice for individual conditions or treatments. Talk to your doctor, nurse or pharmacist before following any medical regimen to see if it is safe and effective for you. Copy sent to:  Shemar Alvarez

## 2020-12-10 ENCOUNTER — APPOINTMENT (OUTPATIENT)
Dept: ULTRASOUND IMAGING | Age: 30
End: 2020-12-10
Attending: INTERNAL MEDICINE
Payer: COMMERCIAL

## 2020-12-10 ENCOUNTER — HOSPITAL ENCOUNTER (OUTPATIENT)
Dept: NUCLEAR MEDICINE | Age: 30
Discharge: HOME | End: 2020-12-10
Attending: INTERNAL MEDICINE
Payer: COMMERCIAL

## 2020-12-10 DIAGNOSIS — E05.90 HYPERTHYROIDISM: ICD-10-CM

## 2020-12-10 DIAGNOSIS — E04.9 GOITER: ICD-10-CM

## 2020-12-10 PROCEDURE — 78014 THYROID IMAGING W/BLOOD FLOW: CPT

## 2020-12-11 ENCOUNTER — HOSPITAL ENCOUNTER (OUTPATIENT)
Dept: NUCLEAR MEDICINE | Age: 30
Discharge: HOME | End: 2020-12-11
Attending: INTERNAL MEDICINE
Payer: COMMERCIAL

## 2020-12-11 ENCOUNTER — HOSPITAL ENCOUNTER (OUTPATIENT)
Dept: ULTRASOUND IMAGING | Age: 30
Discharge: HOME | End: 2020-12-11
Attending: INTERNAL MEDICINE
Payer: COMMERCIAL

## 2020-12-11 DIAGNOSIS — E05.90 HYPERTHYROIDISM: ICD-10-CM

## 2020-12-11 DIAGNOSIS — E04.9 GOITER: ICD-10-CM

## 2020-12-11 LAB
ALBUMIN SERPL-MCNC: 4.4 G/DL (ref 3.9–5)
BUN SERPL-MCNC: 13 MG/DL (ref 6–20)
BUN/CREAT SERPL: 14 (ref 9–23)
CALCIUM SERPL-MCNC: 9.4 MG/DL (ref 8.7–10.2)
CHLORIDE SERPL-SCNC: 105 MMOL/L (ref 96–106)
CO2 SERPL-SCNC: 25 MMOL/L (ref 20–29)
CREAT SERPL-MCNC: 0.91 MG/DL (ref 0.57–1)
GLUCOSE SERPL-MCNC: 104 MG/DL (ref 65–99)
PHOSPHATE SERPL-MCNC: 3.6 MG/DL (ref 3–4.3)
POTASSIUM SERPL-SCNC: 3.6 MMOL/L (ref 3.5–5.2)
PTH-INTACT SERPL-MCNC: 32 PG/ML (ref 15–65)
SODIUM SERPL-SCNC: 142 MMOL/L (ref 134–144)
T3 SERPL-MCNC: 109 NG/DL (ref 71–180)
T4 FREE SERPL-MCNC: 1.35 NG/DL (ref 0.82–1.77)
THYROGLOB AB SERPL-ACNC: <1 IU/ML (ref 0–0.9)
THYROPEROXIDASE AB SERPL-ACNC: <9 IU/ML (ref 0–34)
TSH RECEP AB SER-ACNC: <1.1 IU/L (ref 0–1.75)
TSH SERPL DL<=0.005 MIU/L-ACNC: 0.5 UIU/ML (ref 0.45–4.5)
TSI SER-ACNC: <0.1 IU/L (ref 0–0.55)

## 2020-12-11 PROCEDURE — 76536 US EXAM OF HEAD AND NECK: CPT

## 2020-12-14 NOTE — PROGRESS NOTES
Spoke with pt regarding her labs. Pt is not hypercalcemic and her PTH was normal at 32. No hypercalcemia or hyperparathyroidism as the cause for her renal stone. Her TSH was in a normal range at 0.496. Her FT4 was normal at 1.35 and her Total T3 was normal at 109. Her Grave's and Hashimoto's antibodies were negative. Her Thyroid US showed a 3mm cyst but was unchanged from 2015. Her U&S did not show any evidence of hyperactivity, hot or cold nodules. No thyroid abnormalities (functional or structural) found. Pt voices understanding and agreement with the plan.

## 2021-03-25 ENCOUNTER — ANCILLARY PROCEDURE (OUTPATIENT)
Dept: CARDIOLOGY CLINIC | Age: 31
End: 2021-03-25
Payer: COMMERCIAL

## 2021-03-25 VITALS
BODY MASS INDEX: 29.77 KG/M2 | DIASTOLIC BLOOD PRESSURE: 99 MMHG | WEIGHT: 168 LBS | HEIGHT: 63 IN | SYSTOLIC BLOOD PRESSURE: 154 MMHG

## 2021-03-25 DIAGNOSIS — R06.09 DOE (DYSPNEA ON EXERTION): ICD-10-CM

## 2021-03-25 LAB
ECHO AO ASC DIAM: 3.18 CM
ECHO AO ROOT DIAM: 2.7 CM
ECHO AV PEAK GRADIENT: 10.03 MMHG
ECHO AV PEAK VELOCITY: 158.33 CM/S
ECHO EST RA PRESSURE: 3 MMHG
ECHO LA AREA 4C: 16.02 CM2
ECHO LA MAJOR AXIS: 3.43 CM
ECHO LA MINOR AXIS: 1.91 CM
ECHO LA VOL 2C: 34.4 ML (ref 22–52)
ECHO LA VOL 4C: 35.79 ML (ref 22–52)
ECHO LA VOL BP: 41.32 ML (ref 22–52)
ECHO LA VOL/BSA BIPLANE: 23.01 ML/M2 (ref 16–28)
ECHO LA VOLUME INDEX A2C: 19.16 ML/M2 (ref 16–28)
ECHO LA VOLUME INDEX A4C: 19.93 ML/M2 (ref 16–28)
ECHO LV E' LATERAL VELOCITY: 8.71 CM/S
ECHO LV INTERNAL DIMENSION DIASTOLIC: 3.85 CM (ref 3.9–5.3)
ECHO LV INTERNAL DIMENSION SYSTOLIC: 1.99 CM
ECHO LV ISOVOLUMETRIC RELAXATION TIME (IVRT): 78.02 MS
ECHO LV IVSD: 1.37 CM (ref 0.6–0.9)
ECHO LV MASS 2D: 198.9 G (ref 67–162)
ECHO LV MASS INDEX 2D: 110.8 G/M2 (ref 43–95)
ECHO LV POSTERIOR WALL DIASTOLIC: 1.44 CM (ref 0.6–0.9)
ECHO LVOT PEAK GRADIENT: 3.95 MMHG
ECHO LVOT PEAK VELOCITY: 99.41 CM/S
ECHO MV "A" WAVE DURATION: 116.08 MS
ECHO MV A VELOCITY: 64.7 CM/S
ECHO MV E DECELERATION TIME (DT): 171.28 MS
ECHO MV E VELOCITY: 69.4 CM/S
ECHO MV E/A RATIO: 1.07
ECHO MV E/E' LATERAL: 7.97
ECHO PVEIN A DURATION: 87.54 MS
ECHO PVEIN A VELOCITY: 25.7 CM/S
ECHO RIGHT VENTRICULAR SYSTOLIC PRESSURE (RVSP): 23.61 MMHG
ECHO RV TAPSE: 2.19 CM (ref 1.5–2)
ECHO TV REGURGITANT MAX VELOCITY: 226.99 CM/S
ECHO TV REGURGITANT PEAK GRADIENT: 20.61 MMHG
LA VOL DISK BP: 38.92 ML (ref 22–52)

## 2021-03-25 PROCEDURE — 93306 TTE W/DOPPLER COMPLETE: CPT | Performed by: INTERNAL MEDICINE

## 2022-10-11 NOTE — ED NOTES
POC preg Negative
Pt left ambulatory with discharge instructions and prescriptions on her own.
[facility] [address]  Family Medicine  [address] [place] [id number]  Phone: [phone]  Fax: [phone]  Follow Up Time: 1-3 Days

## 2023-01-21 ENCOUNTER — HOSPITAL ENCOUNTER (EMERGENCY)
Age: 33
Discharge: HOME | End: 2023-01-22
Attending: EMERGENCY MEDICINE
Payer: MEDICAID

## 2023-01-21 DIAGNOSIS — I10 ACCELERATED HYPERTENSION: ICD-10-CM

## 2023-01-21 DIAGNOSIS — U07.1 COVID-19 VIRUS INFECTION: Primary | ICD-10-CM

## 2023-01-21 DIAGNOSIS — J02.9 ACUTE PHARYNGITIS, UNSPECIFIED ETIOLOGY: ICD-10-CM

## 2023-01-21 DIAGNOSIS — G44.209 ACUTE NON INTRACTABLE TENSION-TYPE HEADACHE: ICD-10-CM

## 2023-01-21 DIAGNOSIS — R50.9 ACUTE FEBRILE ILLNESS: ICD-10-CM

## 2023-01-21 PROCEDURE — 87636 SARSCOV2 & INF A&B AMP PRB: CPT

## 2023-01-21 PROCEDURE — 87880 STREP A ASSAY W/OPTIC: CPT

## 2023-01-21 PROCEDURE — 87070 CULTURE OTHR SPECIMN AEROBIC: CPT

## 2023-01-21 PROCEDURE — 99283 EMERGENCY DEPT VISIT LOW MDM: CPT

## 2023-01-21 PROCEDURE — 87147 CULTURE TYPE IMMUNOLOGIC: CPT

## 2023-01-21 PROCEDURE — 74011000250 HC RX REV CODE- 250: Performed by: EMERGENCY MEDICINE

## 2023-01-21 PROCEDURE — 74011250637 HC RX REV CODE- 250/637: Performed by: EMERGENCY MEDICINE

## 2023-01-21 RX ORDER — LIDOCAINE HYDROCHLORIDE 20 MG/ML
15 SOLUTION OROPHARYNGEAL
Status: COMPLETED | OUTPATIENT
Start: 2023-01-21 | End: 2023-01-21

## 2023-01-21 RX ORDER — DEXAMETHASONE SODIUM PHOSPHATE 10 MG/ML
10 INJECTION INTRAMUSCULAR; INTRAVENOUS
Status: COMPLETED | OUTPATIENT
Start: 2023-01-21 | End: 2023-01-21

## 2023-01-21 RX ORDER — BUTALBITAL, ACETAMINOPHEN AND CAFFEINE 50; 325; 40 MG/1; MG/1; MG/1
2 TABLET ORAL
Status: COMPLETED | OUTPATIENT
Start: 2023-01-21 | End: 2023-01-21

## 2023-01-21 RX ORDER — IBUPROFEN 400 MG/1
800 TABLET ORAL
Status: COMPLETED | OUTPATIENT
Start: 2023-01-21 | End: 2023-01-21

## 2023-01-21 RX ADMIN — BUTALBITAL, ACETAMINOPHEN, AND CAFFEINE 2 TABLET: 50; 325; 40 TABLET ORAL at 23:28

## 2023-01-21 RX ADMIN — IBUPROFEN 800 MG: 400 TABLET ORAL at 23:28

## 2023-01-21 RX ADMIN — LIDOCAINE HYDROCHLORIDE 15 ML: 20 SOLUTION TOPICAL at 23:28

## 2023-01-21 RX ADMIN — DEXAMETHASONE SODIUM PHOSPHATE 10 MG: 10 INJECTION INTRAMUSCULAR; INTRAVENOUS at 23:28

## 2023-01-22 VITALS
HEART RATE: 98 BPM | WEIGHT: 184.3 LBS | DIASTOLIC BLOOD PRESSURE: 112 MMHG | RESPIRATION RATE: 18 BRPM | TEMPERATURE: 101.1 F | SYSTOLIC BLOOD PRESSURE: 164 MMHG | OXYGEN SATURATION: 100 % | HEIGHT: 63 IN | BODY MASS INDEX: 32.66 KG/M2

## 2023-01-22 LAB
DEPRECATED S PYO AG THROAT QL EIA: NEGATIVE
FLUAV RNA SPEC QL NAA+PROBE: NOT DETECTED
FLUBV RNA SPEC QL NAA+PROBE: NOT DETECTED
SARS-COV-2, COV2: DETECTED

## 2023-01-22 RX ORDER — PREDNISONE 10 MG/1
TABLET ORAL
Qty: 21 TABLET | Refills: 0 | Status: SHIPPED | OUTPATIENT
Start: 2023-01-22

## 2023-01-22 RX ORDER — BUTALBITAL, ACETAMINOPHEN AND CAFFEINE 300; 40; 50 MG/1; MG/1; MG/1
1 CAPSULE ORAL
Qty: 20 CAPSULE | Refills: 0 | Status: SHIPPED | OUTPATIENT
Start: 2023-01-22

## 2023-01-22 RX ORDER — AZITHROMYCIN 250 MG/1
TABLET, FILM COATED ORAL
Qty: 6 TABLET | Refills: 0 | Status: SHIPPED | OUTPATIENT
Start: 2023-01-22 | End: 2023-01-27

## 2023-01-22 RX ORDER — NAPROXEN 500 MG/1
500 TABLET ORAL 2 TIMES DAILY WITH MEALS
Qty: 20 TABLET | Refills: 0 | Status: SHIPPED | OUTPATIENT
Start: 2023-01-22

## 2023-01-22 NOTE — ED NOTES

## 2023-01-22 NOTE — ED PROVIDER NOTES
EMERGENCY DEPARTMENT HISTORY AND PHYSICAL EXAM             Please note that this dictation was completed with the assistance of \"Dragon\", the computer voice recognition software. Quite often unanticipated grammatical, syntax, homophones, and other interpretive errors are inadvertently transcribed by the computer software. Please disregard these errors and any errors that have escaped final proofreading. Thank you. Date of Evaluation: 01/22/23  Patient: Marina Fernandez  Patient Age and Sex: 28 y.o. female   MRN: [id number]  CSN: [id number]  PCP: Poonam Phillip NP    History of Present Illness     Chief Complaint   Patient presents with    Sore Throat    Chills     History Provided By: Patient/family/EMS (if available)    HPI Limitations : None    HPI: Marina Fernandez, 28 y.o. female with past medical history as documented below presents to the ED with c/o of 1 week history of mild to moderate sore throat, generalized body aches, chills and dry cough. Patient reports that she is not vaccine against COVID-19. She took over-the-counter medications without relief of symptoms. She denies any chance of pregnancy. Patient also reports mild bitemporal headache during this period. Pt denies any other exacerbating or ameliorating factors. There are no other complaints, changes or physical findings pertinent to the HPI at this time. Nursing Notes were all reviewed and agreed with or any disagreements were addressed in the HPI.     Past History   Past Medical History:  Past Medical History:   Diagnosis Date    Abnormal Pap smear     HPV    Anemia 9/30/2010    no problems in this pregnancy    Anemia 9/30/2010    Delivery normal     Elevated BP 9/30/2010    Essential hypertension     chronic    Gestational hypertension 9/16/2010    Hypertension        Past Surgical History:  Past Surgical History:   Procedure Laterality Date    HX LITHOTRIPSY  11/2020    HX OTHER ARTIFICIAL OPENING  11/2020    urethral stent place and removed 3 days       Family History:   Family history reviewed and was non-contributory, unless specified below:  Family History   Problem Relation Age of Onset    Breast Cancer Mother     Sleep Apnea Mother     Diabetes Mother     Cancer Mother         Breast    Hypertension Father     No Known Problems Sister     Stroke Maternal Grandmother     Heart Attack Maternal Grandfather     Liver Disease Paternal Grandmother     Lung Cancer Paternal Grandfather     Cancer Paternal Grandfather         Lung       Social History:  Social History     Tobacco Use    Smoking status: Never    Smokeless tobacco: Never   Substance Use Topics    Alcohol use: Yes     Comment: occasionaly     Drug use: Yes     Types: Marijuana     Comment: daily       Allergies:  No Known Allergies    Current Medications:  No current facility-administered medications on file prior to encounter. Current Outpatient Medications on File Prior to Encounter   Medication Sig Dispense Refill    losartan (COZAAR) 50 mg tablet 100 mg two (2) times a day. (Patient not taking: Reported on 1/21/2023)      amLODIPine (Norvasc) 5 mg tablet Take 1 Tab by mouth daily. (Patient not taking: Reported on 1/21/2023) 14 Tab 0    medroxyPROGESTERone (DEPO-PROVERA) 150 mg/mL injection 150 mg by IntraMUSCular route every three (3) months. Review of Systems   A complete ROS was reviewed by me today and all other systems negative, unless otherwise specified below:  Review of Systems   Constitutional:  Positive for fever. Negative for chills. HENT:  Positive for congestion and sore throat. Eyes: Negative. Respiratory:  Positive for cough. Negative for chest tightness, shortness of breath and wheezing. Cardiovascular: Negative. Negative for chest pain, palpitations and leg swelling. Gastrointestinal: Negative. Negative for abdominal distention, abdominal pain, blood in stool, constipation, diarrhea, nausea and vomiting. Endocrine: Negative. Genitourinary: Negative. Negative for difficulty urinating, dysuria, flank pain, frequency, hematuria and urgency. Musculoskeletal:  Positive for myalgias. Negative for back pain and neck stiffness. Skin: Negative. Negative for rash. Allergic/Immunologic: Negative. Neurological:  Positive for headaches. Negative for dizziness, syncope, weakness, light-headedness and numbness. Hematological: Negative. Psychiatric/Behavioral: Negative. Negative for confusion and self-injury. Physical Exam   Patient Vitals for the past 24 hrs:   Temp Pulse Resp BP SpO2   01/22/23 0015 -- -- -- (!) 164/112 100 %   01/21/23 2332 -- 98 -- -- 100 %   01/21/23 2330 -- -- -- (!) 183/124 98 %   01/21/23 2255 (!) 101.1 °F (38.4 °C) (!) 118 18 (!) 187/128 100 %       Physical Exam  Vitals and nursing note reviewed. Constitutional:       General: She is not in acute distress. Appearance: She is well-developed. She is not diaphoretic. HENT:      Head: Normocephalic and atraumatic. Comments: Minimal pharyngeal erythema, uvula is midline, no tonsillar exudates noted. Nose: Congestion present. Mouth/Throat:      Pharynx: No oropharyngeal exudate. Eyes:      Conjunctiva/sclera: Conjunctivae normal.      Pupils: Pupils are equal, round, and reactive to light. Cardiovascular:      Rate and Rhythm: Normal rate and regular rhythm. Heart sounds: Normal heart sounds. No murmur heard. No friction rub. No gallop. Pulmonary:      Effort: Pulmonary effort is normal. No respiratory distress. Breath sounds: Normal breath sounds. No wheezing or rales. Chest:      Chest wall: No tenderness. Abdominal:      General: Bowel sounds are normal. There is no distension. Palpations: Abdomen is soft. There is no mass. Tenderness: There is no abdominal tenderness. There is no guarding or rebound. Musculoskeletal:         General: No tenderness or deformity. Normal range of motion.       Cervical back: Normal range of motion. Skin:     General: Skin is warm. Findings: No rash. Neurological:      Mental Status: She is alert and oriented to person, place, and time. Cranial Nerves: No cranial nerve deficit. Motor: No abnormal muscle tone. Coordination: Coordination normal.      Deep Tendon Reflexes: Reflexes normal.     Diagnostic Studies     LABORATORY RESULTS:  I have personally reviewed and interpreted all available laboratory results. Recent Results (from the past 24 hour(s))   STREP AG SCREEN, GROUP A    Collection Time: 01/21/23 11:26 PM    Specimen: Swab; Throat   Result Value Ref Range    Group A Strep Ag ID Negative NEG     COVID-19 WITH INFLUENZA A/B    Collection Time: 01/21/23 11:26 PM   Result Value Ref Range    SARS-CoV-2 by PCR Detected (A) NOTD      Influenza A by PCR Not detected      Influenza B by PCR Not detected         RADIOLOGY RESULTS:  I have personally reviewed and interpreted all available imaging studies and agree with radiology interpretation. No orders to display     CT Results  (Last 48 hours)      None          CXR Results  (Last 48 hours)      None          No orders to display        [address]   I am the first and primary ED physician for this patient's ED visit today. I reviewed our EMR for any past records that may contribute to the patient's current condition, including their past medical, surgical, social and family history. This also includes their most recent ED visits, previous hospitalizations and prior diagnostic data. I have reviewed and summarized the most pertinent findings in my HPI and MDM.     Vital Signs Reviewed:  Patient Vitals for the past 24 hrs:   Temp Pulse Resp BP SpO2   01/22/23 0015 -- -- -- (!) 164/112 100 %   01/21/23 2332 -- 98 -- -- 100 %   01/21/23 2330 -- -- -- (!) 183/124 98 %   01/21/23 2255 (!) 101.1 °F (38.4 °C) (!) 118 18 (!) 187/128 100 %     Pulse Oximetry Analysis: 100% on RA with good pleth    Cardiac Monitor:   Rate: 98 bpm  The cardiac monitor revealed the following rhythm as interpreted by me: Normal Sinus Rhythm  Cardiac monitoring was ordered to monitor patient for signs of cardiac dysrhythmia, which they are at risk for based on their history and/or risk for cardiovascular disease and/or metabolic abnormalities. Records Reviewed: Nursing Notes, Old Medical Records, Previous electrocardiograms, Previous Radiology Studies and Previous Laboratory Studies, EMS reports    DIFFERENTIAL DIAGNOSIS AND MDM:  Pt presents with acute URI symptoms including nasal congestion, rhinorrhea and sore throat. Pt also has c/o of cough without dyspnea, chest pain or wheezing. Pt is well-appearing with stable vitals and benign exam; symptoms are consistent with an uncomplicated URI. DDx: COVID-19, acute bronchitis, bacterial sinusitis vs. pharyngitis, migraine, flu. Symptomatic therapy suggested: acetaminophen, ibuprofen, antihistamine-decongestant of choice, cough suppressant of choice. Increase fluids, use vaporizer, stay in steamy bathroom tid 15 min prn severe cough, tylenol as needed, rest, avoid smoky areas. Lack of antibiotic effectiveness discussed with her. Symptomatic therapy suggested: gargle for sore throat, use mist at bedside for congestion. Apply facial warm packs for sinus pain or use nasal saline sprays. Follow up prn if not better in 72 hours. Review of Prior Records and External Documents:   reviewed: YES - I have reviewed the patient's controlled substance prescription history thru the Prescription Monitoring Program so that the prescription(s) for a controlled substance can be given. ED Course: Progress Notes, Reevaluation, and Consults:  Initial assessment performed. I discussed presenting problems and concerns, and my formulated plan for today's visit with the patient and any available family members.  I have encouraged them to ask questions as they arise throughout the visit.     ED Physician Orders:   Orders Placed This Encounter    STREP AG SCREEN, GROUP A    CULTURE, THROAT    COVID-19 WITH INFLUENZA A/B    ENCOURAGE PO FLUIDS    Vital signs    MAY HAVE POPSICLES    butalbital-acetaminophen-caffeine (FIORICET, ESGIC) -40 mg per tablet 2 Tablet    lidocaine (XYLOCAINE) 2 % viscous solution 15 mL    dexamethasone (PF) (DECADRON) 10 mg/mL injection 10 mg    ibuprofen (MOTRIN) tablet 800 mg    butalbital-acetaminophen-caff (Fioricet) -40 mg per capsule    predniSONE (STERAPRED DS) 10 mg dose pack    naproxen (NAPROSYN) 500 mg tablet    azithromycin (Zithromax Z-Jose Alfredo) 250 mg tablet     ED Medications Given:   Medications   butalbital-acetaminophen-caffeine (FIORICET, ESGIC) -40 mg per tablet 2 Tablet (2 Tablets Oral Given 1/21/23 2328)   lidocaine (XYLOCAINE) 2 % viscous solution 15 mL (15 mL Mouth/Throat Given 1/21/23 2328)   dexamethasone (PF) (DECADRON) 10 mg/mL injection 10 mg (10 mg Oral Given 1/21/23 2328)   ibuprofen (MOTRIN) tablet 800 mg (800 mg Oral Given 1/21/23 2328)       ED Physician Interpretation of Test Results: All results were independently reviewed and interpreted by myself, notably showing:     RADIOLOGY:  Non-plain film images such as CT, ultrasound and MRI are read by the radiologist. Plain radiographic images are visualized and preliminarily interpreted by the ED Provider with the below findings:     Imaging interpreted by me:     Interpretation per the Radiologist below, if available at the time of this note:     No orders to display     CT Results  (Last 48 hours)      None          CXR Results  (Last 48 hours)      None          No orders to display       ED physician interpretation of EKG: No STEMI. See my EKG interpretation in ED course above. ED physician interpretation of laboratory results:     Laboratory data interpreted by me: Strep screen negative, influenza negative, COVID test positive.     Progress Note:  Given concerns for COVID-19 infection in this patient, I spent extra time to ensure proper and full PPE was used for the initial assessment and for subsequent patient encounters for updates and reassessments. This was done to help combat the transmission of the virus to myself and other patients and staff in the emergency department. Progress note:  Pt notes feeling better after ED treatment. Pt ambulated with pulse ox with saturations maintain > 92% and tolerated well. Discussed lab and imaging findings with pt, specifically noting possible COVID-19 infection. Patient instructed on proper hygiene and self-quarantine, as well as lying prone for 3 hours a day. Pt instructed to self-isolate at home until 3 days after symptoms have resolved AND 7 days after symptoms first started, whichever is later. Provided with [facility] handout for likely COVID infection recommendations. Pt will follow up with health department or this emergency department immediately should symptoms worsen at any time as instructed. All questions have been answered, pt voiced understanding and agreement with plan. Progress Note:  I have just re-evaluated the patient. Pt reports improvement of her symptoms. I have reviewed her vital signs and determined there is currently no worsening in their condition or physical exam. Results have been reviewed with them and their questions have been answered. I will continue to review further results as they come available. Reassessments:    Medical Decision Making  Amount and/or Complexity of Data Reviewed  Labs: ordered. Risk  Prescription drug management. Shared Decision Making: I considered performing labs and chest x-ray but did not after shared decision making with patient due to clinical picture consistent with viral URI. Patient is well-appearing with stable vital signs. Patient's fever did defervesce with antipyretic. Patient clinically well-hydrated.   Additionally, no focal findings on exam to warrant chest x-ray imaging. Discussed risk of radiation as well. .     Social Determinants of Health:  Patients evaluation and management were significantly impacted by social determinants of health. Social Determinants affecting Dx or Tx: None      Social History     Socioeconomic History    Marital status: SINGLE     Spouse name: Not on file    Number of children: Not on file    Years of education: Not on file    Highest education level: Not on file   Occupational History    Not on file   Tobacco Use    Smoking status: Never    Smokeless tobacco: Never   Substance and Sexual Activity    Alcohol use: Yes     Comment: occasionaly     Drug use: Yes     Types: Marijuana     Comment: daily    Sexual activity: Yes     Partners: Male     Birth control/protection: Injection   Other Topics Concern    Not on file   Social History Narrative    ** Merged History Encounter **          Social Determinants of Health     Financial Resource Strain: Not on file   Food Insecurity: Not on file   Transportation Needs: Not on file   Physical Activity: Not on file   Stress: Not on file   Social Connections: Not on file   Intimate Partner Violence: Not on file   Housing Stability: Not on file     HYPERTENSION COUNSELING  For 10 minutes, education was provided to the patient today regarding their hypertension. Patient is made aware of their elevated blood pressure and is instructed to follow up this week with their Primary Care for a recheck. Patient is counseled regarding consequences of chronic, uncontrolled hypertension including kidney disease, heart disease, stroke or even death. Patient states their understanding and agrees to follow up this week. Additionally, during their visit, I discussed sodium restriction, maintaining ideal body weight and regular exercise program as physiologic means to achieve blood pressure control. The patient will strive towards this.     Disposition:  DISCHARGE  Pt reassessed and symptoms noted to have improved significantly after ED treatment. Renato Mendoza labs and imaging have been reviewed with her and available family. She verbally conveys understanding and agreement of the signs, symptoms, diagnosis, treatment and prognosis and additionally agrees to follow up as recommended with Dr. Jayde Brown, NP and/or specialist as instructed. She agrees with the care plan we have created and conveys that all of her questions have been answered. Additionally, I have put together a packet of discharge instructions for her that include: 1) Educational information regarding their diagnosis, 2) How to care for their diagnosis at home, as well a 3) List of reasons why they would want to return to the ED prior to their follow-up appointment should their condition change or symptoms worsen. I have answered all questions to the patient's satisfaction. Strict return precautions given. She and/or family conveyed understanding and agreement with care plan. Vital signs stable for discharge. PLAN  1. Return precautions as discussed with patient and available family/caregiver. 2.   Discharge Medication List as of 1/22/2023 12:18 AM        START taking these medications    Details   butalbital-acetaminophen-caff (Fioricet) -40 mg per capsule Take 1 Capsule by mouth every four (4) hours as needed for Headache or Migraine. Indications: tension headache, Normal, Disp-20 Capsule, R-0      predniSONE (STERAPRED DS) 10 mg dose pack Take as prescribed, Normal, Disp-21 Tablet, R-0      naproxen (NAPROSYN) 500 mg tablet Take 1 Tablet by mouth two (2) times daily (with meals). , Normal, Disp-20 Tablet, R-0      azithromycin (Zithromax Z-Jose Alfredo) 250 mg tablet Take 2 tablets (500mg) on day 1, and then 1 tablet (250mg) daily for days 2-5., Normal, Disp-6 Tablet, R-0           CONTINUE these medications which have NOT CHANGED    Details   losartan (COZAAR) 50 mg tablet 100 mg two (2) times a day. , Historical Med      amLODIPine (Norvasc) 5 mg tablet Take 1 Tab by mouth daily. , Normal, Disp-14 Tab,R-0      medroxyPROGESTERone (DEPO-PROVERA) 150 mg/mL injection 150 mg by IntraMUSCular route every three (3) months., Historical Med           3. Follow-up Information       Follow up With Specialties Details Why [address] [facility] EMERGENCY DEPT Emergency Medicine  As needed, If symptoms worsen [facility]  [phone]    Manuelito Abraham NP Nurse Practitioner  As needed, If symptoms worsen Raphael Freeman Shruthi Gordon            Instructed to return to ED if worse      FINAL DIAGNOSIS   Clinical Impression:  1. COVID-19 virus infection    2. Acute pharyngitis, unspecified etiology    3. Acute febrile illness    4. Acute non intractable tension-type headache    5. Accelerated hypertension      Attestation:  I am the attending of record for this patient. I personally performed the services described in this documentation on this date, 1/21/2023 for patient, Jeffrey Henry. I have reviewed the chart and verified that the record is accurate and complete.       Zion Vogel MD (Electronic Signature)

## 2023-01-23 LAB
BACTERIA SPEC CULT: ABNORMAL
SERVICE CMNT-IMP: ABNORMAL

## 2023-01-23 RX ORDER — AMOXICILLIN 500 MG/1
500 TABLET, FILM COATED ORAL 2 TIMES DAILY
Qty: 20 TABLET | Refills: 0 | Status: SHIPPED | OUTPATIENT
Start: 2023-01-23 | End: 2023-02-02

## 2023-01-23 NOTE — PROGRESS NOTES
Left HIPAA compliant message return call for results. Patient treated with azithromycin. May require penicillin.

## 2023-01-23 NOTE — ED NOTES
[facility] lab called advised patient tested positive for Group A strep.  Ming Hart PA-C made aware

## 2023-06-07 ENCOUNTER — APPOINTMENT (OUTPATIENT)
Facility: HOSPITAL | Age: 33
End: 2023-06-07
Payer: MEDICAID

## 2023-06-07 ENCOUNTER — HOSPITAL ENCOUNTER (EMERGENCY)
Facility: HOSPITAL | Age: 33
Discharge: HOME | End: 2023-06-07
Attending: STUDENT IN AN ORGANIZED HEALTH CARE EDUCATION/TRAINING PROGRAM
Payer: MEDICAID

## 2023-06-07 VITALS
HEIGHT: 63 IN | HEART RATE: 89 BPM | DIASTOLIC BLOOD PRESSURE: 120 MMHG | WEIGHT: 194 LBS | SYSTOLIC BLOOD PRESSURE: 190 MMHG | TEMPERATURE: 97.8 F | BODY MASS INDEX: 34.38 KG/M2 | RESPIRATION RATE: 16 BRPM | OXYGEN SATURATION: 100 %

## 2023-06-07 DIAGNOSIS — R07.9 CHEST PAIN, UNSPECIFIED TYPE: Primary | ICD-10-CM

## 2023-06-07 DIAGNOSIS — I10 HYPERTENSION, UNSPECIFIED TYPE: ICD-10-CM

## 2023-06-07 DIAGNOSIS — R20.2 TINGLING OF RIGHT UPPER EXTREMITY: ICD-10-CM

## 2023-06-07 LAB
ALBUMIN SERPL-MCNC: 3.6 G/DL (ref 3.5–5)
ALBUMIN/GLOB SERPL: 0.9 (ref 1.1–2.2)
ALP SERPL-CCNC: 225 U/L (ref 45–117)
ALT SERPL-CCNC: 20 U/L (ref 12–78)
ANION GAP SERPL CALC-SCNC: 4 MMOL/L (ref 5–15)
AST SERPL-CCNC: 19 U/L (ref 15–37)
BASOPHILS # BLD: 0 K/UL (ref 0–0.1)
BASOPHILS NFR BLD: 1 % (ref 0–1)
BILIRUB SERPL-MCNC: 0.4 MG/DL (ref 0.2–1)
BUN SERPL-MCNC: 9 MG/DL (ref 6–20)
BUN/CREAT SERPL: 10 (ref 12–20)
CALCIUM SERPL-MCNC: 8.7 MG/DL (ref 8.5–10.1)
CHLORIDE SERPL-SCNC: 103 MMOL/L (ref 97–108)
CO2 SERPL-SCNC: 31 MMOL/L (ref 21–32)
CREAT SERPL-MCNC: 0.91 MG/DL (ref 0.55–1.02)
DIFFERENTIAL METHOD BLD: ABNORMAL
EOSINOPHIL # BLD: 0.1 K/UL (ref 0–0.4)
EOSINOPHIL NFR BLD: 1 % (ref 0–7)
ERYTHROCYTE [DISTWIDTH] IN BLOOD BY AUTOMATED COUNT: 14.6 % (ref 11.5–14.5)
GLOBULIN SER CALC-MCNC: 3.9 G/DL (ref 2–4)
GLUCOSE SERPL-MCNC: 96 MG/DL (ref 65–100)
HCG UR QL: NEGATIVE
HCG UR QL: NEGATIVE
HCT VFR BLD AUTO: 44.3 % (ref 35–47)
HGB BLD-MCNC: 15.1 G/DL (ref 11.5–16)
IMM GRANULOCYTES # BLD AUTO: 0 K/UL (ref 0–0.04)
IMM GRANULOCYTES NFR BLD AUTO: 0 % (ref 0–0.5)
LYMPHOCYTES # BLD: 1.5 K/UL (ref 0.8–3.5)
LYMPHOCYTES NFR BLD: 27 % (ref 12–49)
MCH RBC QN AUTO: 31.9 PG (ref 26–34)
MCHC RBC AUTO-ENTMCNC: 34.1 G/DL (ref 30–36.5)
MCV RBC AUTO: 93.5 FL (ref 80–99)
MONOCYTES # BLD: 0.3 K/UL (ref 0–1)
MONOCYTES NFR BLD: 5 % (ref 5–13)
NEUTS SEG # BLD: 3.7 K/UL (ref 1.8–8)
NEUTS SEG NFR BLD: 66 % (ref 32–75)
NRBC # BLD: 0 K/UL (ref 0–0.01)
NRBC BLD-RTO: 0 PER 100 WBC
NT PRO BNP: 396 PG/ML (ref 0–125)
PLATELET # BLD AUTO: 301 K/UL (ref 150–400)
PMV BLD AUTO: 11 FL (ref 8.9–12.9)
POTASSIUM SERPL-SCNC: 3.9 MMOL/L (ref 3.5–5.1)
PROT SERPL-MCNC: 7.5 G/DL (ref 6.4–8.2)
RBC # BLD AUTO: 4.74 M/UL (ref 3.8–5.2)
SODIUM SERPL-SCNC: 138 MMOL/L (ref 136–145)
TROPONIN I SERPL HS-MCNC: 14 NG/L (ref 0–51)
TROPONIN I SERPL HS-MCNC: 15 NG/L (ref 0–51)
WBC # BLD AUTO: 5.5 K/UL (ref 3.6–11)

## 2023-06-07 PROCEDURE — 36415 COLL VENOUS BLD VENIPUNCTURE: CPT

## 2023-06-07 PROCEDURE — 6360000004 HC RX CONTRAST MEDICATION: Performed by: STUDENT IN AN ORGANIZED HEALTH CARE EDUCATION/TRAINING PROGRAM

## 2023-06-07 PROCEDURE — 80053 COMPREHEN METABOLIC PANEL: CPT

## 2023-06-07 PROCEDURE — 83880 ASSAY OF NATRIURETIC PEPTIDE: CPT

## 2023-06-07 PROCEDURE — 81025 URINE PREGNANCY TEST: CPT

## 2023-06-07 PROCEDURE — 93005 ELECTROCARDIOGRAM TRACING: CPT | Performed by: STUDENT IN AN ORGANIZED HEALTH CARE EDUCATION/TRAINING PROGRAM

## 2023-06-07 PROCEDURE — 99285 EMERGENCY DEPT VISIT HI MDM: CPT

## 2023-06-07 PROCEDURE — 71275 CT ANGIOGRAPHY CHEST: CPT

## 2023-06-07 PROCEDURE — 84484 ASSAY OF TROPONIN QUANT: CPT

## 2023-06-07 PROCEDURE — 85025 COMPLETE CBC W/AUTO DIFF WBC: CPT

## 2023-06-07 PROCEDURE — 73030 X-RAY EXAM OF SHOULDER: CPT

## 2023-06-07 RX ORDER — PREDNISONE 10 MG/1
TABLET ORAL
Qty: 1 EACH | Refills: 0 | Status: SHIPPED | OUTPATIENT
Start: 2023-06-07

## 2023-06-07 RX ORDER — AMLODIPINE BESYLATE 5 MG/1
5 TABLET ORAL DAILY
Qty: 30 TABLET | Refills: 5 | Status: SHIPPED | OUTPATIENT
Start: 2023-06-07

## 2023-06-07 RX ADMIN — IOPAMIDOL 100 ML: 755 INJECTION, SOLUTION INTRAVENOUS at 12:39

## 2023-06-07 ASSESSMENT — PAIN DESCRIPTION - ORIENTATION: ORIENTATION: RIGHT

## 2023-06-07 ASSESSMENT — PAIN - FUNCTIONAL ASSESSMENT: PAIN_FUNCTIONAL_ASSESSMENT: 0-10

## 2023-06-07 ASSESSMENT — PAIN SCALES - GENERAL: PAINLEVEL_OUTOF10: 4

## 2023-06-07 ASSESSMENT — PAIN DESCRIPTION - LOCATION: LOCATION: ARM

## 2023-06-07 NOTE — ED NOTES
Discharge instructions provided. Pt was given copy of discharge instructions with 0 paper script(s) and 2 electronic script(s). Pt verbalized understanding of the medication instructions, and the importance of following up as recommended by EDP. Pt has no further questions at this time. Wheelchair offered from treatment area to hospital entrance, pt declined. Pt leaving ED ambulatory and in stable condition.         Dante Stanford RN  06/07/23 9420

## 2023-06-07 NOTE — ED NOTES
Introduced self to patient at this time. Pt placed on the monitor with continuous pulse ox and BP cuff. Call light within reach. Pt complains of chest tightness this AM that radiated down her left arm. Emergency Department Nursing Plan of Care       The Nursing Plan of Care is developed from the Nursing assessment and Emergency Department Attending provider initial evaluation. The plan of care may be reviewed in the ED Provider note.       The Plan of Care was developed with the following considerations:  Patient / Family readiness to learn indicated by:verbalized understanding and appropriate questions asked  Persons(s) to be included in education: patient  Barriers to Learning/Limitations:None      Signed     Madeleine Chu RN    6/7/2023   11:30 AM         Madeleine Chu RN  06/07/23 1134

## 2023-06-07 NOTE — ED PROVIDER NOTES
[facility] EMERGENCY DEPT  EMERGENCY DEPARTMENT ENCOUNTER       Pt Name: Maday Yun  MRN: [id number]  [place] 1990  Date of evaluation: 6/7/2023  Provider: Luiz Connolly MD   PCP: TIM Brewer NP  Note Started: 3:12 AM 6/7/23     CHIEF COMPLAINT       Chief Complaint   Patient presents with    Chest Pain    Arm Pain     HISTORY OF PRESENT ILLNESS: 1 or more elements      History From: patient, History limited by: none     Maday Yun is a 28 y.o. female w hx of HTN presents to the ED with chest pain. She descrtibes her chest pain as discomfort and possible palpitations on the right side of her chest.  Today she ahd pain that radiated to her right arm and when the pain resolved she had persistent feelings of numbness and tingling. Has had some right shoulder pain and right upper back pain as well. She was concerned for a heart attack. Hx of early heart attack in aunt. No personal Pe hx, no leg swelling, no hemoptysis, does take OCPs. Please See MDM for Additional Details of the HPI/PMH  Nursing Notes were all reviewed and agreed with or any disagreements were addressed in the HPI. REVIEW OF SYSTEMS      Positives and Pertinent negatives as per HPI.     PAST HISTORY     Past Medical History:  Past Medical History:   Diagnosis Date    Abnormal Pap smear     HPV    Anemia 9/30/2010    Anemia 9/30/2010    no problems in this pregnancy    Delivery normal     Elevated BP 9/30/2010    Essential hypertension     chronic    Gestational hypertension 9/16/2010    Hypertension      Past Surgical History:  Past Surgical History:   Procedure Laterality Date    LITHOTRIPSY  11/2020     Family History:  Family History   Problem Relation Age of Onset    Breast Cancer Mother     Sleep Apnea Mother     Diabetes Mother     Cancer Mother         Breast    Hypertension Father     Cancer Paternal Grandfather         Lung    Stroke Maternal Grandmother     Heart Attack Maternal Grandfather     Liver

## 2023-06-07 NOTE — ED TRIAGE NOTES
Complaint of chest pain and right arm pain. Yesterday had right sided chest pain. States this morning felt sharp pain in right shoulder. Still has some residual chest pain as well. Denies prior cardiac hx. Bp elevated- states needs to find new pcp and is out of htn meds.

## 2023-06-07 NOTE — ED NOTES
UA obtained and pregnancy test completed. Pregnancy test negative.       Courtney Robledo RN  06/07/23 9840

## 2023-06-08 LAB
EKG ATRIAL RATE: 82 BPM
EKG DIAGNOSIS: NORMAL
EKG P AXIS: 53 DEGREES
EKG P-R INTERVAL: 148 MS
EKG Q-T INTERVAL: 372 MS
EKG QRS DURATION: 82 MS
EKG QTC CALCULATION (BAZETT): 434 MS
EKG R AXIS: -1 DEGREES
EKG T AXIS: 47 DEGREES
EKG VENTRICULAR RATE: 82 BPM

## 2023-06-08 PROCEDURE — 93010 ELECTROCARDIOGRAM REPORT: CPT | Performed by: SPECIALIST

## 2024-01-25 SDOH — HEALTH STABILITY: PHYSICAL HEALTH: ON AVERAGE, HOW MANY MINUTES DO YOU ENGAGE IN EXERCISE AT THIS LEVEL?: 10 MIN

## 2024-01-25 SDOH — HEALTH STABILITY: PHYSICAL HEALTH: ON AVERAGE, HOW MANY DAYS PER WEEK DO YOU ENGAGE IN MODERATE TO STRENUOUS EXERCISE (LIKE A BRISK WALK)?: 2 DAYS

## 2024-01-26 ENCOUNTER — OFFICE VISIT (OUTPATIENT)
Facility: CLINIC | Age: 34
End: 2024-01-26

## 2024-01-26 ENCOUNTER — OFFICE VISIT (OUTPATIENT)
Age: 34
End: 2024-01-26

## 2024-01-26 ENCOUNTER — TELEPHONE (OUTPATIENT)
Facility: CLINIC | Age: 34
End: 2024-01-26

## 2024-01-26 VITALS
DIASTOLIC BLOOD PRESSURE: 110 MMHG | WEIGHT: 191 LBS | HEART RATE: 113 BPM | HEIGHT: 63 IN | OXYGEN SATURATION: 100 % | SYSTOLIC BLOOD PRESSURE: 152 MMHG | BODY MASS INDEX: 33.84 KG/M2

## 2024-01-26 VITALS
TEMPERATURE: 98 F | HEART RATE: 104 BPM | WEIGHT: 189.8 LBS | RESPIRATION RATE: 20 BRPM | BODY MASS INDEX: 33.63 KG/M2 | HEIGHT: 63 IN | DIASTOLIC BLOOD PRESSURE: 116 MMHG | OXYGEN SATURATION: 97 % | SYSTOLIC BLOOD PRESSURE: 161 MMHG

## 2024-01-26 DIAGNOSIS — I10 PRIMARY HYPERTENSION: ICD-10-CM

## 2024-01-26 DIAGNOSIS — I47.9 PAROXYSMAL TACHYCARDIA (HCC): Primary | ICD-10-CM

## 2024-01-26 DIAGNOSIS — I47.9 PAROXYSMAL TACHYCARDIA (HCC): ICD-10-CM

## 2024-01-26 DIAGNOSIS — R94.31 ABNORMAL ECG: ICD-10-CM

## 2024-01-26 DIAGNOSIS — R00.0 TACHYCARDIA: ICD-10-CM

## 2024-01-26 DIAGNOSIS — R00.2 HEART PALPITATIONS: Primary | ICD-10-CM

## 2024-01-26 DIAGNOSIS — Z76.89 ENCOUNTER TO ESTABLISH CARE: ICD-10-CM

## 2024-01-26 RX ORDER — OLMESARTAN MEDOXOMIL 20 MG/1
20 TABLET ORAL DAILY
Qty: 90 TABLET | Refills: 1 | Status: SHIPPED | OUTPATIENT
Start: 2024-01-26

## 2024-01-26 RX ORDER — PENICILLIN V POTASSIUM 500 MG/1
TABLET ORAL
COMMUNITY
Start: 2024-01-24

## 2024-01-26 RX ORDER — ACETAMINOPHEN AND CODEINE PHOSPHATE 120; 12 MG/5ML; MG/5ML
SOLUTION ORAL
COMMUNITY
Start: 2024-01-16

## 2024-01-26 SDOH — ECONOMIC STABILITY: HOUSING INSECURITY
IN THE LAST 12 MONTHS, WAS THERE A TIME WHEN YOU DID NOT HAVE A STEADY PLACE TO SLEEP OR SLEPT IN A SHELTER (INCLUDING NOW)?: NO

## 2024-01-26 SDOH — ECONOMIC STABILITY: FOOD INSECURITY: WITHIN THE PAST 12 MONTHS, THE FOOD YOU BOUGHT JUST DIDN'T LAST AND YOU DIDN'T HAVE MONEY TO GET MORE.: NEVER TRUE

## 2024-01-26 SDOH — ECONOMIC STABILITY: INCOME INSECURITY: HOW HARD IS IT FOR YOU TO PAY FOR THE VERY BASICS LIKE FOOD, HOUSING, MEDICAL CARE, AND HEATING?: NOT HARD AT ALL

## 2024-01-26 ASSESSMENT — ENCOUNTER SYMPTOMS
FACIAL SWELLING: 0
EYE PAIN: 0
COUGH: 0
EYE ITCHING: 0
BLOOD IN STOOL: 0
SORE THROAT: 0
NAUSEA: 0
RHINORRHEA: 0
SHORTNESS OF BREATH: 0
COLOR CHANGE: 0
ABDOMINAL PAIN: 0
SHORTNESS OF BREATH: 0
CONSTIPATION: 0
PHOTOPHOBIA: 0
CHEST TIGHTNESS: 0
ANAL BLEEDING: 0
BACK PAIN: 0
WHEEZING: 0
VOMITING: 0
SINUS PAIN: 0

## 2024-01-26 ASSESSMENT — SOCIAL DETERMINANTS OF HEALTH (SDOH)

## 2024-01-26 ASSESSMENT — PATIENT HEALTH QUESTIONNAIRE - PHQ9
SUM OF ALL RESPONSES TO PHQ QUESTIONS 1-9: 0
2. FEELING DOWN, DEPRESSED OR HOPELESS: 0
SUM OF ALL RESPONSES TO PHQ QUESTIONS 1-9: 0
SUM OF ALL RESPONSES TO PHQ9 QUESTIONS 1 & 2: 0
1. LITTLE INTEREST OR PLEASURE IN DOING THINGS: 0
SUM OF ALL RESPONSES TO PHQ QUESTIONS 1-9: 0
SUM OF ALL RESPONSES TO PHQ QUESTIONS 1-9: 0

## 2024-01-26 ASSESSMENT — VISUAL ACUITY: OU: 1

## 2024-01-26 NOTE — PATIENT INSTRUCTIONS
Go to ED Chest pain, left arm pain, increase palpitations, Shortness of breath; Pain or discomfort in the jaw, neck, back, arm, or shoulder; Feeling nauseous, light-headed, or unusually tired.    Go to the ED Sudden numbness or weakness in the face, arm, or leg, especially on one side of the body. Sudden confusion, trouble speaking, or difficulty understanding speech. Sudden trouble seeing in one or both eyes. Sudden trouble walking, dizziness, loss of balance, or lack of coordination.

## 2024-01-26 NOTE — PROGRESS NOTES
Hilaria Mcnulty (:  1990) is a 33 y.o. female,New patient, here for evaluation of the following chief complaint(s):  Establish Care      HPI    Patient here to establish care. All current and past medical history reviewed. Family history reviewed. Current medication regimen reviewed. Patient states that she went to the Urgent care for headache and heart palpitations two days ago. Blood Pressure was found to be high and patient was asked to follow up with a PCP. Today in clinic patient B/P was recorded at 168/119 and 161/116. Patient denies CP and SOB, pain in extremities, LLE edema, back pain, nausea or vomiting. Patient does report heart palpitations, fatigue and headache. Denies unilateral weakness dizziness, confusion or slurred speech. States that she taken amlodipine 5 mg daily for hypertension for two years. Reports she was told to stop taken losartan 50 mg by a physician. She was unsure of why she was taken off. Patient does not have an allergy to ACE inhibitors or ARBS. Patient has NKDA. Patient denies Tobacco use. Reports daily alcohol and marijuana use. Denies any other drug use.Currently on Progestin-only oral contraceptive Micronor. Family History of HTN, MI and CVA.      ASSESSMENT/PLAN:    1. Heart palpitations    - AMB POC EKG ROUTINE W/ 12 LEADS, SCREEN ()  [facility]  - 11-Drug Screen, Urine    2. Primary hypertension  - Continue Amlodipine 5mg daily  -  Start olmesartan (BENICAR) 20 MG tablet; Take 1 tablet by mouth daily  Dispense: 90 tablet; Refill: 1  - DASH Diet  - Avoid Caffeine    3. Encounter to establish care    - HIV 1/2 Ag/Ab, 4TH Generation,W Rflx Confirm; Future  - CBC with Auto Differential; Future  - Comprehensive Metabolic Panel; Future  - Hepatitis C Antibody; Future  - Lipid Panel; Future  - Vitamin D 25 Hydroxy; Future  - Hemoglobin A1C; Future    4. Abnormal ECG  - ECG Sinus Tach and Atrial Enlargement  - Echo

## 2024-01-26 NOTE — PATIENT INSTRUCTIONS
I would like you to google \"vagal maneuvers\".  If you feel an abrupt onset of fast heartbeat you can try these to see if it slows her heartbeat down.    I would recommend splashing cold water on your face or bearing down against a closed throat like you are straining to have a bowel movement or blowing forcefully on your thumb as if you were trying to inflate a balloon.    For some reason symptoms seem more severe do not go away on their own within about 10 minutes you should go to the ER to be checked out    We will get you scheduled for a 3-week heart monitor.    Take the amlodipine and Benicar exactly as prescribed and we will recheck your blood pressure to see how it is during your 6-week follow-up.

## 2024-01-26 NOTE — PROGRESS NOTES
readings (when available), as well as personal and family medical history to develop an individualized care plan.    I have spent time discussing both pharmacological and nonpharmacological treatment and preventative care measures for optimizing cardiovascular health and wellness. This includes, but is not limited to: obtaining regular physical activity as tolerated, achieving ideal weight and blood pressure, healthy eating habits, smoking cessation, limiting or eliminating alcohol intake, and avoidance of recreational drug use.    I have emphasized the importance of adherence to the current medication regimen as well as routine follow up for preventative care measures.      Chay Echeverria NP  [facility] at [facility]  [address] , [address], [facility], [address]  P: [phone]  F: [phone]

## 2024-01-26 NOTE — PROGRESS NOTES
Chief Complaint   Patient presents with    Establish Care     1. Have you been to the ER, urgent care clinic since your last visit?  Hospitalized since your last visit?No    2. Have you seen or consulted any other health care providers outside of the [facility] since your last visit?  Include any pap smears or colon screening. No

## 2024-01-26 NOTE — TELEPHONE ENCOUNTER
Pt needs to be scheduled for a 4 day f/u, can you please call her to schedule. I do not see any available times.      [phone]

## 2024-01-27 LAB
25(OH)D3+25(OH)D2 SERPL-MCNC: 4.1 NG/ML (ref 30–100)
ALBUMIN SERPL-MCNC: 4.3 G/DL (ref 3.9–4.9)
ALBUMIN/GLOB SERPL: 1.1 {RATIO} (ref 1.2–2.2)
ALP SERPL-CCNC: 226 IU/L (ref 44–121)
ALT SERPL-CCNC: 19 IU/L (ref 0–32)
AMPHETAMINES UR QL SCN: NEGATIVE NG/ML
AST SERPL-CCNC: 20 IU/L (ref 0–40)
BARBITURATES UR QL SCN: NEGATIVE NG/ML
BASOPHILS # BLD AUTO: 0.1 X10E3/UL (ref 0–0.2)
BASOPHILS NFR BLD AUTO: 1 %
BENZODIAZ UR QL SCN: NEGATIVE NG/ML
BILIRUB SERPL-MCNC: 0.4 MG/DL (ref 0–1.2)
BUN SERPL-MCNC: 12 MG/DL (ref 6–20)
BUN/CREAT SERPL: 15 (ref 9–23)
BUPRENORPHINE UR QL: NEGATIVE NG/ML
BZE UR QL SCN: NEGATIVE NG/ML
CALCIUM SERPL-MCNC: 9.3 MG/DL (ref 8.7–10.2)
CANNABINOIDS UR QL SCN: POSITIVE NG/ML
CHLORIDE SERPL-SCNC: 93 MMOL/L (ref 96–106)
CHOLEST SERPL-MCNC: 241 MG/DL (ref 100–199)
CO2 SERPL-SCNC: 22 MMOL/L (ref 20–29)
CREAT SERPL-MCNC: 0.81 MG/DL (ref 0.57–1)
CREAT UR-MCNC: 258.3 MG/DL (ref 20–300)
EGFRCR SERPLBLD CKD-EPI 2021: 98 ML/MIN/1.73
EOSINOPHIL # BLD AUTO: 0 X10E3/UL (ref 0–0.4)
EOSINOPHIL NFR BLD AUTO: 0 %
ERYTHROCYTE [DISTWIDTH] IN BLOOD BY AUTOMATED COUNT: 14 % (ref 11.7–15.4)
GLOBULIN SER CALC-MCNC: 3.8 G/DL (ref 1.5–4.5)
GLUCOSE SERPL-MCNC: 76 MG/DL (ref 70–99)
HBA1C MFR BLD: 5.6 % (ref 4.8–5.6)
HCT VFR BLD AUTO: 48.9 % (ref 34–46.6)
HCV IGG SERPL QL IA: NON REACTIVE
HDLC SERPL-MCNC: 47 MG/DL
HGB BLD-MCNC: 15.7 G/DL (ref 11.1–15.9)
HIV 1+2 AB+HIV1 P24 AG SERPL QL IA: NON REACTIVE
IMM GRANULOCYTES # BLD AUTO: 0 X10E3/UL (ref 0–0.1)
IMM GRANULOCYTES NFR BLD AUTO: 0 %
IMP & REVIEW OF LAB RESULTS: NORMAL
LABORATORY COMMENT REPORT: ABNORMAL
LDLC SERPL CALC-MCNC: 171 MG/DL (ref 0–99)
LYMPHOCYTES # BLD AUTO: 1.2 X10E3/UL (ref 0.7–3.1)
LYMPHOCYTES NFR BLD AUTO: 13 %
MCH RBC QN AUTO: 29.8 PG (ref 26.6–33)
MCHC RBC AUTO-ENTMCNC: 32.1 G/DL (ref 31.5–35.7)
MCV RBC AUTO: 93 FL (ref 79–97)
METHADONE UR QL SCN: NEGATIVE NG/ML
MONOCYTES # BLD AUTO: 0.6 X10E3/UL (ref 0.1–0.9)
MONOCYTES NFR BLD AUTO: 7 %
NEUTROPHILS # BLD AUTO: 7.5 X10E3/UL (ref 1.4–7)
NEUTROPHILS NFR BLD AUTO: 79 %
OPIATES UR QL SCN: NEGATIVE NG/ML
OXYCODONE+OXYMORPHONE UR QL SCN: NEGATIVE NG/ML
PCP UR QL: NEGATIVE NG/ML
PH UR: 6.3 [PH] (ref 4.5–8.9)
PLATELET # BLD AUTO: 341 X10E3/UL (ref 150–450)
POTASSIUM SERPL-SCNC: 3.3 MMOL/L (ref 3.5–5.2)
PROPOXYPH UR QL SCN: NEGATIVE NG/ML
PROT SERPL-MCNC: 8.1 G/DL (ref 6–8.5)
RBC # BLD AUTO: 5.26 X10E6/UL (ref 3.77–5.28)
SODIUM SERPL-SCNC: 140 MMOL/L (ref 134–144)
T4 FREE SERPL-MCNC: 1.76 NG/DL (ref 0.82–1.77)
TRIGL SERPL-MCNC: 128 MG/DL (ref 0–149)
TSH SERPL DL<=0.005 MIU/L-ACNC: 0.94 UIU/ML (ref 0.45–4.5)
VLDLC SERPL CALC-MCNC: 23 MG/DL (ref 5–40)
WBC # BLD AUTO: 9.4 X10E3/UL (ref 3.4–10.8)

## 2024-02-01 ENCOUNTER — HOSPITAL ENCOUNTER (OUTPATIENT)
Facility: HOSPITAL | Age: 34
Discharge: HOME | End: 2024-02-03
Payer: COMMERCIAL

## 2024-02-01 DIAGNOSIS — R94.31 ABNORMAL ECG: ICD-10-CM

## 2024-02-01 LAB
ECHO AO ROOT DIAM: 2.5 CM
ECHO AV AREA PLAN: 2.3 CM2
ECHO EST RA PRESSURE: 5 MMHG
ECHO LA DIAMETER: 3.6 CM
ECHO LA TO AORTIC ROOT RATIO: 1.44
ECHO LA VOL A-L A4C: 67 ML (ref 22–52)
ECHO LA VOL MOD A4C: 64 ML (ref 22–52)
ECHO LV EDV A4C: 131 ML
ECHO LV EJECTION FRACTION A4C: 68 %
ECHO LV ESV A4C: 42 ML
ECHO LV FRACTIONAL SHORTENING: 34 % (ref 28–44)
ECHO LV INTERNAL DIMENSION DIASTOLIC: 4.4 CM (ref 3.9–5.3)
ECHO LV INTERNAL DIMENSION SYSTOLIC: 2.9 CM
ECHO LV IVSD: 1.6 CM (ref 0.6–0.9)
ECHO LV MASS 2D: 253.4 G (ref 67–162)
ECHO LV POSTERIOR WALL DIASTOLIC: 1.3 CM (ref 0.6–0.9)
ECHO LV RELATIVE WALL THICKNESS RATIO: 0.59
ECHO LVOT AREA: 2.8 CM2
ECHO LVOT DIAM: 1.9 CM
ECHO LVOT PEAK GRADIENT: 5 MMHG
ECHO LVOT PEAK VELOCITY: 1.1 M/S
ECHO MV A VELOCITY: 0.47 M/S
ECHO MV AREA PHT: 4.6 CM2
ECHO MV E DECELERATION TIME (DT): 164.4 MS
ECHO MV E VELOCITY: 0.42 M/S
ECHO MV E/A RATIO: 0.89
ECHO MV MAX VELOCITY: 0.8 M/S
ECHO MV MEAN GRADIENT: 1 MMHG
ECHO MV MEAN VELOCITY: 0.6 M/S
ECHO MV PEAK GRADIENT: 3 MMHG
ECHO MV PRESSURE HALF TIME (PHT): 47.7 MS
ECHO MV REGURGITANT PEAK GRADIENT: 85 MMHG
ECHO MV REGURGITANT PEAK VELOCITY: 4.6 M/S
ECHO MV VTI: 18.6 CM
ECHO PULMONARY ARTERY END DIASTOLIC PRESSURE: 12 MMHG
ECHO PV MAX VELOCITY: 0.8 M/S
ECHO PV PEAK GRADIENT: 3 MMHG
ECHO RIGHT VENTRICULAR SYSTOLIC PRESSURE (RVSP): 31 MMHG
ECHO TV REGURGITANT MAX VELOCITY: 2.56 M/S
ECHO TV REGURGITANT PEAK GRADIENT: 27 MMHG

## 2024-02-01 PROCEDURE — 93306 TTE W/DOPPLER COMPLETE: CPT

## 2024-02-02 ENCOUNTER — TELEPHONE (OUTPATIENT)
Facility: CLINIC | Age: 34
End: 2024-02-02

## 2024-02-02 ENCOUNTER — OFFICE VISIT (OUTPATIENT)
Facility: CLINIC | Age: 34
End: 2024-02-02

## 2024-02-02 DIAGNOSIS — E55.9 VITAMIN D DEFICIENCY: ICD-10-CM

## 2024-02-02 DIAGNOSIS — E78.41 ELEVATED LIPOPROTEIN(A): ICD-10-CM

## 2024-02-02 DIAGNOSIS — I10 PRIMARY HYPERTENSION: Primary | ICD-10-CM

## 2024-02-02 RX ORDER — ERGOCALCIFEROL 1.25 MG/1
50000 CAPSULE ORAL WEEKLY
Qty: 12 CAPSULE | Refills: 1 | Status: SHIPPED | OUTPATIENT
Start: 2024-02-02

## 2024-02-02 SDOH — ECONOMIC STABILITY: FOOD INSECURITY: WITHIN THE PAST 12 MONTHS, THE FOOD YOU BOUGHT JUST DIDN'T LAST AND YOU DIDN'T HAVE MONEY TO GET MORE.: NEVER TRUE

## 2024-02-02 SDOH — ECONOMIC STABILITY: INCOME INSECURITY: HOW HARD IS IT FOR YOU TO PAY FOR THE VERY BASICS LIKE FOOD, HOUSING, MEDICAL CARE, AND HEATING?: NOT HARD AT ALL

## 2024-02-02 SDOH — ECONOMIC STABILITY: FOOD INSECURITY: WITHIN THE PAST 12 MONTHS, YOU WORRIED THAT YOUR FOOD WOULD RUN OUT BEFORE YOU GOT MONEY TO BUY MORE.: NEVER TRUE

## 2024-02-02 ASSESSMENT — SOCIAL DETERMINANTS OF HEALTH (SDOH)
WITHIN THE LAST YEAR, HAVE YOU BEEN HUMILIATED OR EMOTIONALLY ABUSED IN OTHER WAYS BY YOUR PARTNER OR EX-PARTNER?: NO
WITHIN THE LAST YEAR, HAVE YOU BEEN KICKED, HIT, SLAPPED, OR OTHERWISE PHYSICALLY HURT BY YOUR PARTNER OR EX-PARTNER?: NO
WITHIN THE LAST YEAR, HAVE YOU BEEN AFRAID OF YOUR PARTNER OR EX-PARTNER?: NO
WITHIN THE LAST YEAR, HAVE TO BEEN RAPED OR FORCED TO HAVE ANY KIND OF SEXUAL ACTIVITY BY YOUR PARTNER OR EX-PARTNER?: NO

## 2024-02-02 ASSESSMENT — PATIENT HEALTH QUESTIONNAIRE - PHQ9
1. LITTLE INTEREST OR PLEASURE IN DOING THINGS: 0
SUM OF ALL RESPONSES TO PHQ QUESTIONS 1-9: 0
SUM OF ALL RESPONSES TO PHQ QUESTIONS 1-9: 0
2. FEELING DOWN, DEPRESSED OR HOPELESS: 0
SUM OF ALL RESPONSES TO PHQ9 QUESTIONS 1 & 2: 0
SUM OF ALL RESPONSES TO PHQ QUESTIONS 1-9: 0
SUM OF ALL RESPONSES TO PHQ QUESTIONS 1-9: 0

## 2024-02-02 NOTE — PROGRESS NOTES
Chief Complaint   Patient presents with    Follow-up     1. Have you been to the ER, urgent care clinic since your last visit?  Hospitalized since your last visit?No    2. Have you seen or consulted any other health care providers outside of the [facility] since your last visit?  Include any pap smears or colon screening. No  .

## 2024-02-03 VITALS
SYSTOLIC BLOOD PRESSURE: 145 MMHG | BODY MASS INDEX: 31.24 KG/M2 | HEIGHT: 66 IN | OXYGEN SATURATION: 98 % | DIASTOLIC BLOOD PRESSURE: 90 MMHG | WEIGHT: 194.4 LBS | RESPIRATION RATE: 20 BRPM | HEART RATE: 94 BPM | TEMPERATURE: 98 F

## 2024-02-03 DIAGNOSIS — I10 PRIMARY HYPERTENSION: ICD-10-CM

## 2024-02-03 RX ORDER — OLMESARTAN MEDOXOMIL 40 MG/1
40 TABLET ORAL DAILY
Qty: 30 TABLET | Refills: 1 | Status: SHIPPED | OUTPATIENT
Start: 2024-02-03 | End: 2024-04-03

## 2024-02-03 ASSESSMENT — ENCOUNTER SYMPTOMS
CHEST TIGHTNESS: 0
BACK PAIN: 0
WHEEZING: 0
COLOR CHANGE: 0
RHINORRHEA: 0
DIARRHEA: 0
EYE ITCHING: 0
PHOTOPHOBIA: 0
SORE THROAT: 0
NAUSEA: 0
ABDOMINAL PAIN: 0
CONSTIPATION: 0
FACIAL SWELLING: 0
SHORTNESS OF BREATH: 0
VOMITING: 0
ANAL BLEEDING: 0
COUGH: 0
SINUS PAIN: 0
BLOOD IN STOOL: 0
EYE PAIN: 0

## 2024-02-03 NOTE — PROGRESS NOTES
Hilaria Mcnulty (:  1990) is a 33 y.o. female,Established patient, here for evaluation of the following chief complaint(s):  Follow-up      HPI    Patient here for follow up for blood pressure. Patient reports taking her blood pressure medication everyday. Patient reports a low sodium diet. No sodas, drinks water, does add some sodium to her food. Patient did not bring home recordings to the visit. Blood pressure today in clinic 144/110, 145 /90. Denies SOB, Chest Pain. Reports no new episodes of heart palpitations. Patient is seeing Cardiology for heart palpitations.Patient reports marijuana use daily, occasional alcohol use, denies tobacco use.    Patient made aware of most recent lab results. Patient started on Vitamin D. Total Cholesterol 241, Elevated cholesterol       ASSESSMENT/PLAN:    1. Primary hypertension    - Increased dose of Olmesartan Start Olmesartan (BENICAR) 40 MG tablet; Take 1 tablet by mouth daily  Dispense: 30 tablet; Refill: 1   - DASH DIET   - 30 minutes for physical activity 5 times a week.  - Increase water intake to eight 8 oz glasses of water  - Consume healthy diet including fruits, vegetables and lean meat.     2. Vitamin D deficiency    - vitamin D (ERGOCALCIFEROL) 1.25 MG (35940 UT) CAPS capsule; Take 1 capsule by mouth once a week  Dispense: 12 capsule; Refill: 1    3. Elevated Lipoprotein     - 30 minutes for physical activity 5 times a week.  - Increase water intake to eight 8 oz glasses of water  - Consume healthy diet including fruits, vegetables and lean meat.           Follow up in one week to check blood pressure.      SUBJECTIVE/OBJECTIVE:    Review of Symptoms    Review of Systems   Constitutional:  Negative for appetite change, fatigue, fever and unexpected weight change.   HENT:  Negative for congestion, ear pain, facial swelling, rhinorrhea, sinus pain, sneezing, sore throat and tinnitus.    Eyes:  Negative for photophobia, pain, itching and visual

## 2024-02-09 ENCOUNTER — HOSPITAL ENCOUNTER (OUTPATIENT)
Facility: HOSPITAL | Age: 34
Discharge: HOME | End: 2024-02-09
Payer: COMMERCIAL

## 2024-02-09 DIAGNOSIS — I47.9 PAROXYSMAL TACHYCARDIA (HCC): ICD-10-CM

## 2024-02-09 PROCEDURE — 93270 REMOTE 30 DAY ECG REV/REPORT: CPT

## 2024-02-09 NOTE — PROGRESS NOTES
14 day event monitor placed on patient. Patient educated on device and its use. Also reviewed materials provided in kit. Calendar provided to patient with dates to charge device. Extra supplies available within kit. Patient to return the device to [employer] via [employer] after order duration. Contact information provided to the department for any questions. No questions at this time.      [facility] # [id number]

## 2024-03-14 RX ORDER — OLMESARTAN MEDOXOMIL 40 MG/1
40 TABLET ORAL DAILY
Qty: 90 TABLET | OUTPATIENT
Start: 2024-03-14 | End: 2024-05-13

## 2024-03-15 ENCOUNTER — OFFICE VISIT (OUTPATIENT)
Facility: CLINIC | Age: 34
End: 2024-03-15

## 2024-03-15 VITALS
HEART RATE: 82 BPM | TEMPERATURE: 98 F | HEIGHT: 66 IN | BODY MASS INDEX: 31.9 KG/M2 | SYSTOLIC BLOOD PRESSURE: 130 MMHG | OXYGEN SATURATION: 94 % | WEIGHT: 198.5 LBS | RESPIRATION RATE: 20 BRPM | DIASTOLIC BLOOD PRESSURE: 80 MMHG

## 2024-03-15 DIAGNOSIS — I10 PRIMARY HYPERTENSION: ICD-10-CM

## 2024-03-15 RX ORDER — OLMESARTAN MEDOXOMIL 40 MG/1
40 TABLET ORAL DAILY
Qty: 90 TABLET | Refills: 0 | Status: SHIPPED | OUTPATIENT
Start: 2024-03-15 | End: 2024-06-13

## 2024-03-15 RX ORDER — AMLODIPINE BESYLATE 5 MG/1
5 TABLET ORAL DAILY
Qty: 30 TABLET | Refills: 5 | Status: SHIPPED | OUTPATIENT
Start: 2024-03-15

## 2024-03-15 SDOH — ECONOMIC STABILITY: INCOME INSECURITY: HOW HARD IS IT FOR YOU TO PAY FOR THE VERY BASICS LIKE FOOD, HOUSING, MEDICAL CARE, AND HEATING?: NOT HARD AT ALL

## 2024-03-15 SDOH — ECONOMIC STABILITY: FOOD INSECURITY: WITHIN THE PAST 12 MONTHS, THE FOOD YOU BOUGHT JUST DIDN'T LAST AND YOU DIDN'T HAVE MONEY TO GET MORE.: NEVER TRUE

## 2024-03-15 SDOH — ECONOMIC STABILITY: FOOD INSECURITY: WITHIN THE PAST 12 MONTHS, YOU WORRIED THAT YOUR FOOD WOULD RUN OUT BEFORE YOU GOT MONEY TO BUY MORE.: NEVER TRUE

## 2024-03-15 ASSESSMENT — ENCOUNTER SYMPTOMS
NAUSEA: 0
CONSTIPATION: 0
CHEST TIGHTNESS: 0
COUGH: 0
VOMITING: 0
DIARRHEA: 0
SORE THROAT: 0
BACK PAIN: 0
FACIAL SWELLING: 0
COLOR CHANGE: 0
EYE ITCHING: 0
EYE PAIN: 0
PHOTOPHOBIA: 0
SHORTNESS OF BREATH: 0
RHINORRHEA: 0
ANAL BLEEDING: 0
ABDOMINAL PAIN: 0
SINUS PAIN: 0
WHEEZING: 0
BLOOD IN STOOL: 0

## 2024-03-15 ASSESSMENT — PATIENT HEALTH QUESTIONNAIRE - PHQ9
SUM OF ALL RESPONSES TO PHQ QUESTIONS 1-9: 0
SUM OF ALL RESPONSES TO PHQ QUESTIONS 1-9: 0
2. FEELING DOWN, DEPRESSED OR HOPELESS: 0
SUM OF ALL RESPONSES TO PHQ9 QUESTIONS 1 & 2: 0
SUM OF ALL RESPONSES TO PHQ QUESTIONS 1-9: 0
1. LITTLE INTEREST OR PLEASURE IN DOING THINGS: 0
SUM OF ALL RESPONSES TO PHQ QUESTIONS 1-9: 0

## 2024-03-15 NOTE — PROGRESS NOTES
Chief Complaint   Patient presents with    Follow-up     1. Have you been to the ER, urgent care clinic since your last visit?  Hospitalized since your last visit?No    2. Have you seen or consulted any other health care providers outside of the [facility] since your last visit?  Include any pap smears or colon screening. No    
  Neurological:      Mental Status: She is alert and oriented to person, place, and time.      Sensory: No sensory deficit.      Motor: No weakness.      Gait: Gait normal.   Psychiatric:         Mood and Affect: Mood normal.         Judgment: Judgment normal.          I have reviewed with the patient details of the assessment and plan and all questions were answered. Relevent patient education was performed.The most recent lab findings were reviewed with the patient. Prescribed medication side effects and warnings were discussed with patient. Patient verbalized understanding of all things discussed.     An After Visit Summary was printed and given to the patient.       On this date 3/15/2024 I have spent 30 minutes reviewing previous notes, test results and face to face with the patient discussing the diagnosis and importance of compliance with the treatment plan as well as documenting on the day of the visit.      An electronic signature was used to authenticate this note.    --TIM Holguin - MOHIT MSN,FNP-BC

## 2024-05-03 ENCOUNTER — OFFICE VISIT (OUTPATIENT)
Facility: CLINIC | Age: 34
End: 2024-05-03
Payer: COMMERCIAL

## 2024-05-03 VITALS
DIASTOLIC BLOOD PRESSURE: 80 MMHG | WEIGHT: 204 LBS | OXYGEN SATURATION: 99 % | TEMPERATURE: 98 F | RESPIRATION RATE: 20 BRPM | SYSTOLIC BLOOD PRESSURE: 130 MMHG | BODY MASS INDEX: 36.14 KG/M2 | HEART RATE: 74 BPM | HEIGHT: 63 IN

## 2024-05-03 DIAGNOSIS — E55.9 VITAMIN D DEFICIENCY: Primary | ICD-10-CM

## 2024-05-03 DIAGNOSIS — I10 PRIMARY HYPERTENSION: ICD-10-CM

## 2024-05-03 DIAGNOSIS — E87.6 LOW BLOOD POTASSIUM: ICD-10-CM

## 2024-05-03 PROCEDURE — 3075F SYST BP GE 130 - 139MM HG: CPT

## 2024-05-03 PROCEDURE — 3079F DIAST BP 80-89 MM HG: CPT

## 2024-05-03 PROCEDURE — 99213 OFFICE O/P EST LOW 20 MIN: CPT

## 2024-05-03 RX ORDER — OLMESARTAN MEDOXOMIL 40 MG/1
40 TABLET ORAL DAILY
Qty: 90 TABLET | Refills: 0 | OUTPATIENT
Start: 2024-05-03 | End: 2024-08-01

## 2024-05-03 SDOH — ECONOMIC STABILITY: FOOD INSECURITY: WITHIN THE PAST 12 MONTHS, THE FOOD YOU BOUGHT JUST DIDN'T LAST AND YOU DIDN'T HAVE MONEY TO GET MORE.: NEVER TRUE

## 2024-05-03 SDOH — ECONOMIC STABILITY: FOOD INSECURITY: WITHIN THE PAST 12 MONTHS, YOU WORRIED THAT YOUR FOOD WOULD RUN OUT BEFORE YOU GOT MONEY TO BUY MORE.: NEVER TRUE

## 2024-05-03 SDOH — ECONOMIC STABILITY: INCOME INSECURITY: HOW HARD IS IT FOR YOU TO PAY FOR THE VERY BASICS LIKE FOOD, HOUSING, MEDICAL CARE, AND HEATING?: NOT HARD AT ALL

## 2024-05-03 ASSESSMENT — PATIENT HEALTH QUESTIONNAIRE - PHQ9
SUM OF ALL RESPONSES TO PHQ QUESTIONS 1-9: 0
SUM OF ALL RESPONSES TO PHQ9 QUESTIONS 1 & 2: 0
SUM OF ALL RESPONSES TO PHQ QUESTIONS 1-9: 0
2. FEELING DOWN, DEPRESSED OR HOPELESS: NOT AT ALL
SUM OF ALL RESPONSES TO PHQ QUESTIONS 1-9: 0
SUM OF ALL RESPONSES TO PHQ QUESTIONS 1-9: 0
1. LITTLE INTEREST OR PLEASURE IN DOING THINGS: NOT AT ALL

## 2024-05-03 ASSESSMENT — ENCOUNTER SYMPTOMS
SHORTNESS OF BREATH: 0
EYE PAIN: 0
SINUS PAIN: 0
PHOTOPHOBIA: 0
ABDOMINAL PAIN: 0
COLOR CHANGE: 0
SORE THROAT: 0
DIARRHEA: 0
FACIAL SWELLING: 0
VOMITING: 0
COUGH: 0
CONSTIPATION: 0
RHINORRHEA: 0
NAUSEA: 0
ANAL BLEEDING: 0
BLOOD IN STOOL: 0
CHEST TIGHTNESS: 0
WHEEZING: 0
BACK PAIN: 0
EYE ITCHING: 0

## 2024-05-03 NOTE — PROGRESS NOTES
Hilaria Mcnulty (:  1990) is a 33 y.o. female,Established patient, here for evaluation of the following chief complaint(s):  Follow-up      HPI    Patient here for follow-up blood pressure and labs.  Patient blood pressure is still controlled patient asked to continue blood pressure.  Discussed low-sodium diet and exercise.  Patient was started on vitamin D for vitamin D deficiency will redraw labs today.  Last CMP slightly low potassium patient asked to increase potassium diet will redraw labs today.      Health maintenance    Patient states that she has an appointment tomorrow with OB/GYN to have Pap smear done.      ASSESSMENT/PLAN:    1. Primary hypertension    - olmesartan (BENICAR) 40 MG tablet; Take 1 tablet by mouth daily  Dispense: 90 tablet; Refill: 0  - DASH Diet    2. Vitamin D deficiency    - Vitamin D 25 Hydroxy; Future    3. Low blood potassium    - Comprehensive Metabolic Panel; Future      Return in about 5 months (around 10/3/2024).     SUBJECTIVE/OBJECTIVE:    Review of Symptoms    Review of Systems   Constitutional:  Negative for appetite change, fatigue, fever and unexpected weight change.   HENT:  Negative for congestion, ear pain, facial swelling, rhinorrhea, sinus pain, sneezing, sore throat and tinnitus.    Eyes:  Negative for photophobia, pain, itching and visual disturbance.   Respiratory:  Negative for cough, chest tightness, shortness of breath and wheezing.    Cardiovascular:  Negative for chest pain, palpitations and leg swelling.   Gastrointestinal:  Negative for abdominal pain, anal bleeding, blood in stool, constipation, diarrhea, nausea and vomiting.   Endocrine: Negative for cold intolerance, heat intolerance, polydipsia, polyphagia and polyuria.   Genitourinary:  Negative for difficulty urinating, dysuria, flank pain, frequency, menstrual problem, pelvic pain and urgency.   Musculoskeletal:  Negative for arthralgias, back pain and joint swelling.   Skin:  Negative

## 2024-05-03 NOTE — PROGRESS NOTES
Chief Complaint   Patient presents with    Follow-up     1. Have you been to the ER, urgent care clinic since your last visit?  Hospitalized since your last visit?No    2. Have you seen or consulted any other health care providers outside of the [facility] since your last visit?  Include any pap smears or colon screening. No

## 2024-05-04 LAB
25(OH)D3+25(OH)D2 SERPL-MCNC: 16.5 NG/ML (ref 30–100)
ALBUMIN SERPL-MCNC: 3.9 G/DL (ref 3.9–4.9)
ALBUMIN/GLOB SERPL: 1.3 {RATIO} (ref 1.2–2.2)
ALP SERPL-CCNC: 214 IU/L (ref 44–121)
ALT SERPL-CCNC: 17 IU/L (ref 0–32)
AST SERPL-CCNC: 19 IU/L (ref 0–40)
BILIRUB SERPL-MCNC: 0.3 MG/DL (ref 0–1.2)
BUN SERPL-MCNC: 8 MG/DL (ref 6–20)
BUN/CREAT SERPL: 11 (ref 9–23)
CALCIUM SERPL-MCNC: 8.9 MG/DL (ref 8.7–10.2)
CHLORIDE SERPL-SCNC: 104 MMOL/L (ref 96–106)
CO2 SERPL-SCNC: 22 MMOL/L (ref 20–29)
CREAT SERPL-MCNC: 0.71 MG/DL (ref 0.57–1)
EGFRCR SERPLBLD CKD-EPI 2021: 115 ML/MIN/1.73
GLOBULIN SER CALC-MCNC: 3.1 G/DL (ref 1.5–4.5)
GLUCOSE SERPL-MCNC: 84 MG/DL (ref 70–99)
POTASSIUM SERPL-SCNC: 4.4 MMOL/L (ref 3.5–5.2)
PROT SERPL-MCNC: 7 G/DL (ref 6–8.5)
SODIUM SERPL-SCNC: 141 MMOL/L (ref 134–144)

## 2024-05-09 ENCOUNTER — TELEPHONE (OUTPATIENT)
Facility: CLINIC | Age: 34
End: 2024-05-09

## 2024-05-09 NOTE — TELEPHONE ENCOUNTER
Pt called and stated that she never received her BP medication, OLMESARTAN.  She stated that her pharmacy never received it.  Could you please re-send?

## 2024-05-22 ENCOUNTER — APPOINTMENT (OUTPATIENT)
Facility: HOSPITAL | Age: 34
End: 2024-05-22
Payer: COMMERCIAL

## 2024-05-22 ENCOUNTER — HOSPITAL ENCOUNTER (EMERGENCY)
Facility: HOSPITAL | Age: 34
Discharge: HOME | End: 2024-05-22
Attending: STUDENT IN AN ORGANIZED HEALTH CARE EDUCATION/TRAINING PROGRAM
Payer: COMMERCIAL

## 2024-05-22 VITALS
RESPIRATION RATE: 18 BRPM | HEART RATE: 88 BPM | OXYGEN SATURATION: 100 % | SYSTOLIC BLOOD PRESSURE: 143 MMHG | TEMPERATURE: 98.6 F | DIASTOLIC BLOOD PRESSURE: 90 MMHG

## 2024-05-22 DIAGNOSIS — N20.0 KIDNEY STONE: Primary | ICD-10-CM

## 2024-05-22 LAB
ALBUMIN SERPL-MCNC: 3.6 G/DL (ref 3.5–5)
ALBUMIN/GLOB SERPL: 0.8 (ref 1.1–2.2)
ALP SERPL-CCNC: 227 U/L (ref 45–117)
ALT SERPL-CCNC: 18 U/L (ref 12–78)
ANION GAP SERPL CALC-SCNC: 5 MMOL/L (ref 5–15)
APPEARANCE UR: CLEAR
AST SERPL-CCNC: 16 U/L (ref 15–37)
BACTERIA URNS QL MICRO: NEGATIVE /HPF
BASOPHILS # BLD: 0 K/UL (ref 0–0.1)
BASOPHILS NFR BLD: 0 % (ref 0–1)
BILIRUB SERPL-MCNC: 0.3 MG/DL (ref 0.2–1)
BILIRUB UR QL: NEGATIVE
BUN SERPL-MCNC: 9 MG/DL (ref 6–20)
BUN/CREAT SERPL: 8 (ref 12–20)
CALCIUM SERPL-MCNC: 8.4 MG/DL (ref 8.5–10.1)
CHLORIDE SERPL-SCNC: 101 MMOL/L (ref 97–108)
CO2 SERPL-SCNC: 31 MMOL/L (ref 21–32)
COLOR UR: ABNORMAL
CREAT SERPL-MCNC: 1.06 MG/DL (ref 0.55–1.02)
DIFFERENTIAL METHOD BLD: ABNORMAL
EOSINOPHIL # BLD: 0 K/UL (ref 0–0.4)
EOSINOPHIL NFR BLD: 0 % (ref 0–7)
EPITH CASTS URNS QL MICRO: ABNORMAL /LPF
ERYTHROCYTE [DISTWIDTH] IN BLOOD BY AUTOMATED COUNT: 14.9 % (ref 11.5–14.5)
GLOBULIN SER CALC-MCNC: 4.3 G/DL (ref 2–4)
GLUCOSE SERPL-MCNC: 161 MG/DL (ref 65–100)
GLUCOSE UR STRIP.AUTO-MCNC: NEGATIVE MG/DL
HCG UR QL: NEGATIVE
HCT VFR BLD AUTO: 41.9 % (ref 35–47)
HGB BLD-MCNC: 13.8 G/DL (ref 11.5–16)
HGB UR QL STRIP: NEGATIVE
IMM GRANULOCYTES # BLD AUTO: 0 K/UL (ref 0–0.04)
IMM GRANULOCYTES NFR BLD AUTO: 0 % (ref 0–0.5)
KETONES UR QL STRIP.AUTO: ABNORMAL MG/DL
LEUKOCYTE ESTERASE UR QL STRIP.AUTO: ABNORMAL
LIPASE SERPL-CCNC: 27 U/L (ref 13–75)
LYMPHOCYTES # BLD: 1.6 K/UL (ref 0.8–3.5)
LYMPHOCYTES NFR BLD: 18 % (ref 12–49)
MCH RBC QN AUTO: 30.7 PG (ref 26–34)
MCHC RBC AUTO-ENTMCNC: 32.9 G/DL (ref 30–36.5)
MCV RBC AUTO: 93.3 FL (ref 80–99)
MONOCYTES # BLD: 0.4 K/UL (ref 0–1)
MONOCYTES NFR BLD: 4 % (ref 5–13)
NEUTS SEG # BLD: 6.9 K/UL (ref 1.8–8)
NEUTS SEG NFR BLD: 78 % (ref 32–75)
NITRITE UR QL STRIP.AUTO: NEGATIVE
NRBC # BLD: 0 K/UL (ref 0–0.01)
NRBC BLD-RTO: 0 PER 100 WBC
PH UR STRIP: 8 (ref 5–8)
PLATELET # BLD AUTO: 337 K/UL (ref 150–400)
PMV BLD AUTO: 10.3 FL (ref 8.9–12.9)
POTASSIUM SERPL-SCNC: 3 MMOL/L (ref 3.5–5.1)
PROT SERPL-MCNC: 7.9 G/DL (ref 6.4–8.2)
PROT UR STRIP-MCNC: NEGATIVE MG/DL
RBC # BLD AUTO: 4.49 M/UL (ref 3.8–5.2)
RBC #/AREA URNS HPF: ABNORMAL /HPF (ref 0–5)
SODIUM SERPL-SCNC: 137 MMOL/L (ref 136–145)
SP GR UR REFRACTOMETRY: 1.01
URINE CULTURE IF INDICATED: ABNORMAL
UROBILINOGEN UR QL STRIP.AUTO: 0.2 EU/DL (ref 0.2–1)
WBC # BLD AUTO: 9 K/UL (ref 3.6–11)
WBC URNS QL MICRO: ABNORMAL /HPF (ref 0–4)

## 2024-05-22 PROCEDURE — 81025 URINE PREGNANCY TEST: CPT

## 2024-05-22 PROCEDURE — 96376 TX/PRO/DX INJ SAME DRUG ADON: CPT

## 2024-05-22 PROCEDURE — 6360000002 HC RX W HCPCS: Performed by: STUDENT IN AN ORGANIZED HEALTH CARE EDUCATION/TRAINING PROGRAM

## 2024-05-22 PROCEDURE — 36415 COLL VENOUS BLD VENIPUNCTURE: CPT

## 2024-05-22 PROCEDURE — 81001 URINALYSIS AUTO W/SCOPE: CPT

## 2024-05-22 PROCEDURE — 85025 COMPLETE CBC W/AUTO DIFF WBC: CPT

## 2024-05-22 PROCEDURE — 96375 TX/PRO/DX INJ NEW DRUG ADDON: CPT

## 2024-05-22 PROCEDURE — 6370000000 HC RX 637 (ALT 250 FOR IP): Performed by: STUDENT IN AN ORGANIZED HEALTH CARE EDUCATION/TRAINING PROGRAM

## 2024-05-22 PROCEDURE — 80053 COMPREHEN METABOLIC PANEL: CPT

## 2024-05-22 PROCEDURE — 96374 THER/PROPH/DIAG INJ IV PUSH: CPT

## 2024-05-22 PROCEDURE — 74176 CT ABD & PELVIS W/O CONTRAST: CPT

## 2024-05-22 PROCEDURE — 83690 ASSAY OF LIPASE: CPT

## 2024-05-22 PROCEDURE — 99284 EMERGENCY DEPT VISIT MOD MDM: CPT

## 2024-05-22 RX ORDER — MORPHINE SULFATE 4 MG/ML
4 INJECTION, SOLUTION INTRAMUSCULAR; INTRAVENOUS
Status: COMPLETED | OUTPATIENT
Start: 2024-05-22 | End: 2024-05-22

## 2024-05-22 RX ORDER — ACETAMINOPHEN 500 MG
1000 TABLET ORAL
Status: COMPLETED | OUTPATIENT
Start: 2024-05-22 | End: 2024-05-22

## 2024-05-22 RX ORDER — NAPROXEN 500 MG/1
500 TABLET ORAL 2 TIMES DAILY
Qty: 30 TABLET | Refills: 0 | Status: SHIPPED | OUTPATIENT
Start: 2024-05-22

## 2024-05-22 RX ORDER — OXYCODONE HYDROCHLORIDE 5 MG/1
5 TABLET ORAL EVERY 6 HOURS PRN
Qty: 12 TABLET | Refills: 0 | Status: SHIPPED | OUTPATIENT
Start: 2024-05-22 | End: 2024-05-25

## 2024-05-22 RX ORDER — ONDANSETRON 2 MG/ML
4 INJECTION INTRAMUSCULAR; INTRAVENOUS ONCE
Status: COMPLETED | OUTPATIENT
Start: 2024-05-22 | End: 2024-05-22

## 2024-05-22 RX ORDER — TAMSULOSIN HYDROCHLORIDE 0.4 MG/1
0.4 CAPSULE ORAL DAILY
Qty: 5 CAPSULE | Refills: 0 | Status: SHIPPED | OUTPATIENT
Start: 2024-05-22

## 2024-05-22 RX ORDER — POTASSIUM CHLORIDE 750 MG/1
40 TABLET, FILM COATED, EXTENDED RELEASE ORAL ONCE
Status: COMPLETED | OUTPATIENT
Start: 2024-05-22 | End: 2024-05-22

## 2024-05-22 RX ORDER — MORPHINE SULFATE 4 MG/ML
4 INJECTION, SOLUTION INTRAMUSCULAR; INTRAVENOUS ONCE
Status: COMPLETED | OUTPATIENT
Start: 2024-05-22 | End: 2024-05-22

## 2024-05-22 RX ORDER — POTASSIUM CHLORIDE 20 MEQ/1
40 TABLET, EXTENDED RELEASE ORAL DAILY
Qty: 4 TABLET | Refills: 0 | Status: SHIPPED | OUTPATIENT
Start: 2024-05-22 | End: 2024-05-24

## 2024-05-22 RX ADMIN — ACETAMINOPHEN 1000 MG: 500 TABLET ORAL at 14:00

## 2024-05-22 RX ADMIN — MORPHINE SULFATE 4 MG: 4 INJECTION INTRAVENOUS at 13:55

## 2024-05-22 RX ADMIN — ONDANSETRON 4 MG: 2 INJECTION INTRAMUSCULAR; INTRAVENOUS at 13:55

## 2024-05-22 RX ADMIN — POTASSIUM CHLORIDE 40 MEQ: 750 TABLET, EXTENDED RELEASE ORAL at 14:55

## 2024-05-22 RX ADMIN — MORPHINE SULFATE 4 MG: 4 INJECTION INTRAVENOUS at 18:02

## 2024-05-22 ASSESSMENT — ENCOUNTER SYMPTOMS
NAUSEA: 1
ABDOMINAL PAIN: 0
DIARRHEA: 0
VOMITING: 1

## 2024-05-22 ASSESSMENT — PAIN - FUNCTIONAL ASSESSMENT: PAIN_FUNCTIONAL_ASSESSMENT: 0-10

## 2024-05-22 ASSESSMENT — PAIN DESCRIPTION - ORIENTATION: ORIENTATION: RIGHT

## 2024-05-22 ASSESSMENT — LIFESTYLE VARIABLES
HOW MANY STANDARD DRINKS CONTAINING ALCOHOL DO YOU HAVE ON A TYPICAL DAY: 3 OR 4
HOW OFTEN DO YOU HAVE A DRINK CONTAINING ALCOHOL: MONTHLY OR LESS

## 2024-05-22 ASSESSMENT — PAIN SCALES - GENERAL
PAINLEVEL_OUTOF10: 10
PAINLEVEL_OUTOF10: 0

## 2024-05-22 ASSESSMENT — PAIN DESCRIPTION - DESCRIPTORS: DESCRIPTORS: STABBING

## 2024-05-22 ASSESSMENT — PAIN DESCRIPTION - LOCATION: LOCATION: FLANK

## 2024-05-22 NOTE — ED NOTES
Discharge instructions were given to the patient by DO Hakan  .     The patient left the Emergency Department ambulatory, alert and oriented and in no acute distress with 4 prescriptions. The patient was encouraged to call or return to the ED for worsening issues or problems and was encouraged to schedule a follow up appointment for continuing care. Patient discharged home ambulatory with a steady gait and designated .    The patient verbalized understanding of discharge instructions and prescriptions, all questions were answered. The patient has no further concerns at this time.

## 2024-05-22 NOTE — ED PROVIDER NOTES
[facility] EMERGENCY DEPT  EMERGENCY DEPARTMENT ENCOUNTER       Pt Name: Hilaria Mcnulty  MRN: [id number]  Birthdate 1990  Date of evaluation: 5/22/2024  Provider: Ignacia Escoto DO   PCP: Christine Bryan APRN - CNP  Note Started: 1:43 PM 5/22/24     CHIEF COMPLAINT       Chief Complaint   Patient presents with    Flank Pain        HISTORY OF PRESENT ILLNESS: 1 or more elements      History From: Patient  None     Hilaria Mcnulty is a 33 y.o. female with pmhx of kidney stones, one requiring stenting with urology who presents to the ER for complaint of right flank pain that started this morning.  Patient states the pain started as a dull ache in her right flank and now has wrapped partially around to the right side.  The pain is sharp and moderate to severe at its worst.  Patient states she initially felt like she needed to go to the bathroom and was having urinary urgency but that has resolved.  She tried to have a bowel movement and was unable to.  She states this feels exactly like previous kidney stones.  She denies dysuria, hematuria, vaginal discharge, vaginal bleeding, diarrhea, fevers or chills.  She did have 1 episode of nonbloody emesis and feels nauseous.  No history of surgeries on her abdomen.     Nursing Notes were all reviewed and agreed with or any disagreements were addressed in the HPI.     REVIEW OF SYSTEMS      Review of Systems   Constitutional:  Negative for chills and fever.   Gastrointestinal:  Positive for nausea and vomiting. Negative for abdominal pain and diarrhea.   Genitourinary:  Positive for flank pain and urgency. Negative for dysuria, hematuria, vaginal bleeding, vaginal discharge and vaginal pain.   All other systems reviewed and are negative.    Positives and Pertinent negatives as per HPI.    PAST HISTORY     Past Medical History:  Past Medical History:   Diagnosis Date    Abnormal Pap smear     HPV    Anemia 9/30/2010    Anemia 9/30/2010    no problems in this pregnancy

## 2024-05-22 NOTE — ED TRIAGE NOTES
Patient is ambulatory to triage with cc of right flank pain tht started today. She denies dysuria. Hx of kidney stones

## 2024-05-22 NOTE — DISCHARGE INSTRUCTIONS
1.7 mm calculus is thought to be lodged in the ureteral lumen. Nonobstructing intrarenal calculi bilaterally. STOMACH: Unremarkable. SMALL BOWEL: No dilatation or wall thickening. COLON: No dilatation or wall thickening. APPENDIX: Normal PERITONEUM: No ascites or pneumoperitoneum. RETROPERITONEUM: No lymphadenopathy or aortic aneurysm. REPRODUCTIVE ORGANS: Unremarkable for age. URINARY BLADDER: No mass or calculus. BONES: No destructive bone lesion. ABDOMINAL WALL: Small umbilical hernia containing fat only. ADDITIONAL COMMENTS: N/A     1. Partial obstruction of the right kidney and proximal ureter by 1.7 mm calculus lodged in the right proximal ureteral lumen near the UPJ. 2. Nonobstructing intrarenal calculi bilaterally.     Recent Results (from the past 24 hour(s))   CBC with Auto Differential    Collection Time: 05/22/24  1:51 PM   Result Value Ref Range    WBC 9.0 3.6 - 11.0 K/uL    RBC 4.49 3.80 - 5.20 M/uL    Hemoglobin 13.8 11.5 - 16.0 g/dL    Hematocrit 41.9 35.0 - 47.0 %    MCV 93.3 80.0 - 99.0 FL    MCH 30.7 26.0 - 34.0 PG    MCHC 32.9 30.0 - 36.5 g/dL    RDW 14.9 (H) 11.5 - 14.5 %    Platelets 337 150 - 400 K/uL    MPV 10.3 8.9 - 12.9 FL    Nucleated RBCs 0.0 0  WBC    nRBC 0.00 0.00 - 0.01 K/uL    Neutrophils % 78 (H) 32 - 75 %    Lymphocytes % 18 12 - 49 %    Monocytes % 4 (L) 5 - 13 %    Eosinophils % 0 0 - 7 %    Basophils % 0 0 - 1 %    Immature Granulocytes % 0 0.0 - 0.5 %    Neutrophils Absolute 6.9 1.8 - 8.0 K/UL    Lymphocytes Absolute 1.6 0.8 - 3.5 K/UL    Monocytes Absolute 0.4 0.0 - 1.0 K/UL    Eosinophils Absolute 0.0 0.0 - 0.4 K/UL    Basophils Absolute 0.0 0.0 - 0.1 K/UL    Immature Granulocytes Absolute 0.0 0.00 - 0.04 K/UL    Differential Type AUTOMATED     CMP    Collection Time: 05/22/24  1:51 PM   Result Value Ref Range    Sodium 137 136 - 145 mmol/L    Potassium 3.0 (L) 3.5 - 5.1 mmol/L    Chloride 101 97 - 108 mmol/L    CO2 31 21 - 32 mmol/L    Anion Gap 5 5 - 15 mmol/L

## 2024-05-22 NOTE — ED NOTES
Pt presents ambulatory to ED complaining of right sided flank and abdominal pain that started today. Pt denies pain with urination or change in urinary frequency. Pt has hx of kidney stones. She reports feeling nauseas and vomiting bile. Pt has hx of HTN and prescribed medication, she reports taking it today. Pt is alert and oriented x 4, RR even and unlabored, skin is warm and dry. Assessment completed and pt updated on plan of care.        Emergency Department Nursing Plan of Care        The Nursing Plan of Care is developed from the Nursing assessment and Emergency Department Attending provider initial evaluation.  The plan of care may be reviewed in the “ED Provider note”.

## 2024-06-13 DIAGNOSIS — I10 PRIMARY HYPERTENSION: ICD-10-CM

## 2024-06-13 RX ORDER — AMLODIPINE BESYLATE 5 MG/1
5 TABLET ORAL DAILY
Qty: 90 TABLET | Refills: 2 | Status: SHIPPED | OUTPATIENT
Start: 2024-06-13

## 2024-06-13 RX ORDER — OLMESARTAN MEDOXOMIL 40 MG/1
40 TABLET ORAL DAILY
Qty: 90 TABLET | Refills: 2 | Status: SHIPPED | OUTPATIENT
Start: 2024-06-13

## 2024-12-27 ENCOUNTER — HOSPITAL ENCOUNTER (EMERGENCY)
Facility: HOSPITAL | Age: 34
Discharge: HOME | End: 2024-12-27
Attending: EMERGENCY MEDICINE
Payer: COMMERCIAL

## 2024-12-27 VITALS
DIASTOLIC BLOOD PRESSURE: 106 MMHG | WEIGHT: 200.5 LBS | TEMPERATURE: 97.7 F | HEIGHT: 63 IN | HEART RATE: 85 BPM | RESPIRATION RATE: 16 BRPM | BODY MASS INDEX: 35.53 KG/M2 | OXYGEN SATURATION: 100 % | SYSTOLIC BLOOD PRESSURE: 150 MMHG

## 2024-12-27 DIAGNOSIS — J02.9 ACUTE PHARYNGITIS, UNSPECIFIED ETIOLOGY: Primary | ICD-10-CM

## 2024-12-27 LAB — S PYO DNA THROAT QL NAA+PROBE: NOT DETECTED

## 2024-12-27 PROCEDURE — 87651 STREP A DNA AMP PROBE: CPT

## 2024-12-27 PROCEDURE — 99283 EMERGENCY DEPT VISIT LOW MDM: CPT

## 2024-12-27 ASSESSMENT — PAIN SCALES - GENERAL: PAINLEVEL_OUTOF10: 8

## 2024-12-27 ASSESSMENT — PAIN DESCRIPTION - LOCATION: LOCATION: THROAT

## 2024-12-27 ASSESSMENT — PAIN - FUNCTIONAL ASSESSMENT: PAIN_FUNCTIONAL_ASSESSMENT: 0-10

## 2024-12-27 NOTE — ED NOTES
Pt presents to ED via home complaining of sore throat x 2 days. Pt is alert and oriented x 4, RR even and unlabored, skin is warm and dry. Pt appears in NAD at this time. Assessment completed and pt updated on plan of care.  Call bell in reach.     Emergency Department Nursing Plan of Care  The Nursing Plan of Care is developed from the Nursing assessment and Emergency Department Attending provider initial evaluation.  The plan of care may be reviewed in the “ED Provider note”.  The Plan of Care was developed with the following considerations:  Patient / Family readiness to learn indicated by:Refer to Medical chart in [facility]  Persons(s) to be included in education: Refer to Medical chart in [facility]  Barriers to Learning/Limitations:Normal

## 2024-12-27 NOTE — ED PROVIDER NOTES
in the MDM section     RADIOLOGY:  Non-plain film images such as CT, Ultrasound and MRI are read by the radiologist. Plain radiographic images are visualized and preliminarily interpreted by the ED Provider with the findings documented in the MDM section.     Interpretation per the Radiologist below, if available at the time of this note:     [unfilled]   None       PROCEDURES   Unless otherwise noted below, none  Procedures     CRITICAL CARE TIME   0    EMERGENCY DEPARTMENT COURSE and DIFFERENTIAL DIAGNOSIS/MDM   Vitals:    Vitals:    12/27/24 0707   BP: (!) 150/106   Pulse: 85   Resp: 16   Temp: 97.7 °F (36.5 °C)   TempSrc: Tympanic   SpO2: 100%   Weight: 90.9 kg (200 lb 8 oz)   Height: 1.6 m (5' 3\")        Patient was given the following medications:  Medications - No data to display    Medical Decision Making  Amount and/or Complexity of Data Reviewed  Labs: ordered. Decision-making details documented in ED Course.    Risk  OTC drugs.  Prescription drug management.      Healthy 34-year-old female presenting to the emergency department with 2 days of pharyngitis without known exposure to sick contact.  She is afebrile vital signs are stable, tolerating secretions.  She does have some mild cervical lymphadenopathy and an erythematous posterior oropharynx without exudate, tonsillar swelling or edema.  No concern for PTA, RPA, no red flags.  Symptoms appear consistent with viral pharyngitis, consider strep pharyngitis however strep PCR negative.  Low Centor score, will hold on empiric antibiotics.  Encourage conservative therapy with Tylenol, ibuprofen, warm salt water rinses, lozenges, and given strict return precautions.           FINAL IMPRESSION     1. Acute pharyngitis, unspecified etiology          DISPOSITION/PLAN     Discharge Note:  The patient has been re-evaluated and is ready for discharge. Reviewed available results with patient. Counseled patient on diagnosis and care plan. Patient has expressed  understanding, and all questions have been answered. Patient agrees with plan and agrees to follow up as recommended, or to return to the ED if their symptoms worsen. Discharge instructions have been provided and explained to the patient, along with reasons to return to the ED.        CLINICAL IMPRESSION    1. Acute pharyngitis, unspecified etiology         DISPOSITION  Discharge     PATIENT REFERRED TO:  Beach, April N, APRN - CNP  [address] [facility] [address] [phone]    Call       [facility] EMERGENCY DEPT  [address] [place] [id number]  [phone]  Go to   As needed, If symptoms worsen        DISCHARGE MEDICATIONS:     Medication List        START taking these medications      Benzocaine 15 MG Lozg  Take 1 lozenge by mouth every 4-6 hours as needed (sore throat)            ASK your doctor about these medications      amLODIPine 5 MG tablet  Commonly known as: NORVASC  Take 1 tablet by mouth daily     naproxen 500 MG tablet  Commonly known as: Naprosyn  Take 1 tablet by mouth 2 times daily     norethindrone 0.35 MG tablet  Commonly known as: MICRONOR     olmesartan 40 MG tablet  Commonly known as: Benicar  Take 1 tablet by mouth daily     penicillin v potassium 500 MG tablet  Commonly known as: VEETID     potassium chloride 20 MEQ extended release tablet  Commonly known as: KLOR-CON M  Take 2 tablets by mouth daily for 2 days     tamsulosin 0.4 MG capsule  Commonly known as: FLOMAX  Take 1 capsule by mouth daily     vitamin D 1.25 MG (10985 UT) Caps capsule  Commonly known as: ERGOCALCIFEROL  Take 1 capsule by mouth once a week               Where to Get Your Medications        These medications were sent to [id number] - [place], [place] - [address] - P [phone] - F [phone] [address] [facility] [address]      Phone: [phone]   Benzocaine 15 MG Lozg           DISCONTINUED MEDICATIONS:  Current Discharge Medication List          I am the

## 2024-12-27 NOTE — ED NOTES
Pt presents ambulatory to triage complaining of sore throat x 2 days. Pt denies any other sx, sick contacts, or medications today. Pt has hx of HTN but did not take her medications today.

## 2025-06-14 DIAGNOSIS — I10 PRIMARY HYPERTENSION: ICD-10-CM

## 2025-06-16 RX ORDER — OLMESARTAN MEDOXOMIL 40 MG/1
40 TABLET ORAL DAILY
Qty: 90 TABLET | Refills: 2 | Status: SHIPPED | OUTPATIENT
Start: 2025-06-16

## 2025-06-16 RX ORDER — AMLODIPINE BESYLATE 5 MG/1
5 TABLET ORAL DAILY
Qty: 90 TABLET | Refills: 2 | Status: SHIPPED | OUTPATIENT
Start: 2025-06-16